# Patient Record
Sex: FEMALE | Race: WHITE | NOT HISPANIC OR LATINO | Employment: OTHER | URBAN - METROPOLITAN AREA
[De-identification: names, ages, dates, MRNs, and addresses within clinical notes are randomized per-mention and may not be internally consistent; named-entity substitution may affect disease eponyms.]

---

## 2018-07-24 ENCOUNTER — HOSPITAL ENCOUNTER (OUTPATIENT)
Dept: GENERAL RADIOLOGY | Facility: HOSPITAL | Age: 78
Discharge: HOME | End: 2018-07-24
Attending: ORTHOPAEDIC SURGERY

## 2018-07-24 ENCOUNTER — APPOINTMENT (OUTPATIENT)
Dept: PREADMISSION TESTING | Facility: HOSPITAL | Age: 78
End: 2018-07-24

## 2018-07-24 ENCOUNTER — HOSPITAL ENCOUNTER (OUTPATIENT)
Dept: GENERAL RADIOLOGY | Facility: HOSPITAL | Age: 78
Discharge: HOME | End: 2018-07-24
Attending: ORTHOPAEDIC SURGERY | Admitting: ORTHOPAEDIC SURGERY

## 2018-07-24 VITALS
HEART RATE: 59 BPM | WEIGHT: 197.8 LBS | RESPIRATION RATE: 16 BRPM | HEIGHT: 65 IN | DIASTOLIC BLOOD PRESSURE: 74 MMHG | TEMPERATURE: 97.9 F | OXYGEN SATURATION: 98 % | BODY MASS INDEX: 32.96 KG/M2 | SYSTOLIC BLOOD PRESSURE: 144 MMHG

## 2018-07-24 LAB
ALBUMIN SERPL-MCNC: 4 G/DL (ref 3.5–5.2)
ALBUMIN/GLOB SERPL: 1.4 G/DL
ALP SERPL-CCNC: 54 U/L (ref 39–117)
ALT SERPL W P-5'-P-CCNC: 10 U/L (ref 1–33)
ANION GAP SERPL CALCULATED.3IONS-SCNC: 9 MMOL/L
APTT PPP: 27 SECONDS (ref 22.7–35.4)
AST SERPL-CCNC: 13 U/L (ref 1–32)
BACTERIA UR QL AUTO: ABNORMAL /HPF
BASOPHILS # BLD AUTO: 0.01 10*3/MM3 (ref 0–0.2)
BASOPHILS NFR BLD AUTO: 0.2 % (ref 0–1.5)
BILIRUB SERPL-MCNC: 0.2 MG/DL (ref 0.1–1.2)
BILIRUB UR QL STRIP: NEGATIVE
BUN BLD-MCNC: 22 MG/DL (ref 8–23)
BUN/CREAT SERPL: 27.8 (ref 7–25)
CALCIUM SPEC-SCNC: 9.4 MG/DL (ref 8.6–10.5)
CHLORIDE SERPL-SCNC: 98 MMOL/L (ref 98–107)
CLARITY UR: CLEAR
CO2 SERPL-SCNC: 27 MMOL/L (ref 22–29)
COLOR UR: YELLOW
CREAT BLD-MCNC: 0.79 MG/DL (ref 0.57–1)
DEPRECATED RDW RBC AUTO: 47.6 FL (ref 37–54)
EOSINOPHIL # BLD AUTO: 0.12 10*3/MM3 (ref 0–0.7)
EOSINOPHIL NFR BLD AUTO: 3 % (ref 0.3–6.2)
ERYTHROCYTE [DISTWIDTH] IN BLOOD BY AUTOMATED COUNT: 13.4 % (ref 11.7–13)
GFR SERPL CREATININE-BSD FRML MDRD: 70 ML/MIN/1.73
GLOBULIN UR ELPH-MCNC: 2.8 GM/DL
GLUCOSE BLD-MCNC: 74 MG/DL (ref 65–99)
GLUCOSE UR STRIP-MCNC: NEGATIVE MG/DL
HCT VFR BLD AUTO: 35.8 % (ref 35.6–45.5)
HGB BLD-MCNC: 11.4 G/DL (ref 11.9–15.5)
HGB UR QL STRIP.AUTO: NEGATIVE
HYALINE CASTS UR QL AUTO: ABNORMAL /LPF
IMM GRANULOCYTES # BLD: 0 10*3/MM3 (ref 0–0.03)
IMM GRANULOCYTES NFR BLD: 0 % (ref 0–0.5)
INR PPP: 1.01 (ref 0.9–1.1)
KETONES UR QL STRIP: NEGATIVE
LEUKOCYTE ESTERASE UR QL STRIP.AUTO: ABNORMAL
LYMPHOCYTES # BLD AUTO: 1.76 10*3/MM3 (ref 0.9–4.8)
LYMPHOCYTES NFR BLD AUTO: 43.9 % (ref 19.6–45.3)
MCH RBC QN AUTO: 30.9 PG (ref 26.9–32)
MCHC RBC AUTO-ENTMCNC: 31.8 G/DL (ref 32.4–36.3)
MCV RBC AUTO: 97 FL (ref 80.5–98.2)
MONOCYTES # BLD AUTO: 0.32 10*3/MM3 (ref 0.2–1.2)
MONOCYTES NFR BLD AUTO: 8 % (ref 5–12)
NEUTROPHILS # BLD AUTO: 1.8 10*3/MM3 (ref 1.9–8.1)
NEUTROPHILS NFR BLD AUTO: 44.9 % (ref 42.7–76)
NITRITE UR QL STRIP: NEGATIVE
PH UR STRIP.AUTO: 6 [PH] (ref 5–8)
PLATELET # BLD AUTO: 207 10*3/MM3 (ref 140–500)
PMV BLD AUTO: 10.1 FL (ref 6–12)
POTASSIUM BLD-SCNC: 4.2 MMOL/L (ref 3.5–5.2)
PROT SERPL-MCNC: 6.8 G/DL (ref 6–8.5)
PROT UR QL STRIP: NEGATIVE
PROTHROMBIN TIME: 13.2 SECONDS (ref 11.7–14.2)
RBC # BLD AUTO: 3.69 10*6/MM3 (ref 3.9–5.2)
RBC # UR: ABNORMAL /HPF
REF LAB TEST METHOD: ABNORMAL
SODIUM BLD-SCNC: 134 MMOL/L (ref 136–145)
SP GR UR STRIP: 1.02 (ref 1–1.03)
SQUAMOUS #/AREA URNS HPF: ABNORMAL /HPF
UROBILINOGEN UR QL STRIP: ABNORMAL
WBC NRBC COR # BLD: 4.01 10*3/MM3 (ref 4.5–10.7)
WBC UR QL AUTO: ABNORMAL /HPF

## 2018-07-24 PROCEDURE — 85610 PROTHROMBIN TIME: CPT | Performed by: ORTHOPAEDIC SURGERY

## 2018-07-24 PROCEDURE — 80053 COMPREHEN METABOLIC PANEL: CPT | Performed by: ORTHOPAEDIC SURGERY

## 2018-07-24 PROCEDURE — 73560 X-RAY EXAM OF KNEE 1 OR 2: CPT

## 2018-07-24 PROCEDURE — 85730 THROMBOPLASTIN TIME PARTIAL: CPT | Performed by: ORTHOPAEDIC SURGERY

## 2018-07-24 PROCEDURE — 36415 COLL VENOUS BLD VENIPUNCTURE: CPT

## 2018-07-24 PROCEDURE — 93010 ELECTROCARDIOGRAM REPORT: CPT | Performed by: INTERNAL MEDICINE

## 2018-07-24 PROCEDURE — 87186 SC STD MICRODIL/AGAR DIL: CPT | Performed by: ORTHOPAEDIC SURGERY

## 2018-07-24 PROCEDURE — 93005 ELECTROCARDIOGRAM TRACING: CPT

## 2018-07-24 PROCEDURE — 87086 URINE CULTURE/COLONY COUNT: CPT | Performed by: ORTHOPAEDIC SURGERY

## 2018-07-24 PROCEDURE — 71046 X-RAY EXAM CHEST 2 VIEWS: CPT

## 2018-07-24 PROCEDURE — 81001 URINALYSIS AUTO W/SCOPE: CPT | Performed by: ORTHOPAEDIC SURGERY

## 2018-07-24 PROCEDURE — 85025 COMPLETE CBC W/AUTO DIFF WBC: CPT | Performed by: ORTHOPAEDIC SURGERY

## 2018-07-24 RX ORDER — OMEPRAZOLE/SODIUM BICARBONATE 20MG-1.1G
1 CAPSULE ORAL EVERY OTHER DAY
COMMUNITY

## 2018-07-24 RX ORDER — CHLORHEXIDINE GLUCONATE 500 MG/1
CLOTH TOPICAL
Status: ON HOLD | COMMUNITY
End: 2018-08-06

## 2018-07-24 RX ORDER — LISINOPRIL 10 MG/1
10 TABLET ORAL DAILY
COMMUNITY

## 2018-07-24 ASSESSMENT — KOOS JR
KOOS JR SCORE: 20
KOOS JR SCORE: 36.931

## 2018-07-24 NOTE — DISCHARGE INSTRUCTIONS
Take the following medications the morning of surgery with a small sip of water:  ZEGERID    ARRIVE AT  8AM.        General Instructions:  • Do not eat solid food after midnight the night before surgery.  • You may drink clear liquids day of surgery but must stop at least one hour before your hospital arrival time.  • It is beneficial for you to have a clear drink that contains carbohydrates the day of surgery.  We suggest a 12 to 20 ounce bottle of Gatorade or Powerade for non-diabetic patients or a 12 to 20 ounce bottle of G2 or Powerade Zero for diabetic patients. (Pediatric patients, are not advised to drink a 12 to 20 ounce carbohydrate drink)    Clear liquids are liquids you can see through.  Nothing red in color.     Plain water                               Sports drinks  Sodas                                   Gelatin (Jell-O)  Fruit juices without pulp such as white grape juice and apple juice  Popsicles that contain no fruit or yogurt  Tea or coffee (no cream or milk added)  Gatorade / Powerade  G2 / Powerade Zero    • Infants may have breast milk up to four hours before surgery.  • Infants drinking formula may drink formula up to six hours before surgery.   • Patients who avoid smoking, chewing tobacco and alcohol for 4 weeks prior to surgery have a reduced risk of post-operative complications.  Quit smoking as many days before surgery as you can.  • Do not smoke, use chewing tobacco or drink alcohol the day of surgery.   • If applicable bring your C-PAP/ BI-PAP machine.  • Bring any papers given to you in the doctor’s office.  • Wear clean comfortable clothes and socks.  • Do not wear contact lenses or make-up.  Bring a case for your glasses.   • Bring crutches or walker if applicable.  • Remove all piercings.  Leave jewelry and any other valuables at home.  • Hair extensions with metal clips must be removed prior to surgery.  • The Pre-Admission Testing nurse will instruct you to bring medications if  unable to obtain an accurate list in Pre-Admission Testing.        If you were given a blood bank ID arm band remember to bring it with you the day of surgery.    Preventing a Surgical Site Infection:  • For 2 to 3 days before surgery, avoid shaving with a razor because the razor can irritate skin and make it easier to develop an infection.    • Any areas of open skin can increase the risk of a post-operative wound infection by allowing bacteria to enter and travel throughout the body.  Notify your surgeon if you have any skin wounds / rashes even if it is not near the expected surgical site.  The area will need assessed to determine if surgery should be delayed until it is healed.  • The night prior to surgery sleep in a clean bed with clean clothing.  Do not allow pets to sleep with you.  • Shower on the morning of surgery using a fresh bar of anti-bacterial soap (such as Dial) and clean washcloth.  Dry with a clean towel and dress in clean clothing.  • Ask your surgeon if you will be receiving antibiotics prior to surgery.  • Make sure you, your family, and all healthcare providers clean their hands with soap and water or an alcohol based hand  before caring for you or your wound.    Day of surgery:  Upon arrival, a Pre-op nurse and Anesthesiologist will review your health history, obtain vital signs, and answer questions you may have.  The only belongings needed at this time will be your home medications and if applicable your C-PAP/BI-PAP machine.  If you are staying overnight your family can leave the rest of your belongings in the car and bring them to your room later.  A Pre-op nurse will start an IV and you may receive medication in preparation for surgery, including something to help you relax.  Your family will be able to see you in the Pre-op area.  While you are in surgery your family should notify the waiting room  if they leave the waiting room area and provide a contact phone  number.    Please be aware that surgery does come with discomfort.  We want to make every effort to control your discomfort so please discuss any uncontrolled symptoms with your nurse.   Your doctor will most likely have prescribed pain medications.      If you are going home after surgery you will receive individualized written care instructions before being discharged.  A responsible adult must drive you to and from the hospital on the day of your surgery and stay with you for 24 hours.    If you are staying overnight following surgery, you will be transported to your hospital room following the recovery period.  [facility] has all private rooms.    You have received a list of surgical assistants for your reference.  If you have any questions please call Pre-Admission Testing at [phone].  Deductibles and co-payments are collected on the day of service. Please be prepared to pay the required co-pay, deductible or deposit on the day of service as defined by your plan.    2% CHLORAHEXIDINE GLUCONATE* CLOTH  Preparing or “prepping” skin before surgery can reduce the risk of infection at the surgical site. To make the process easier, [facility] has chosen disposable cloths moistened with a rinse-free, 2% Chlorhexidine Gluconate (CHG) antiseptic solution. The steps below outline the prepping process and should be carefully followed.        Use the prep cloth on the area that is circled in the diagram             Directions Night before Surgery  1) Shower using a fresh bar of anti-bacterial soap (such as Dial) and clean washcloth.  Use a clean towel to completely dry your skin.  2) Do not use any lotions, oils or creams on your skin.  3) Open the package and remove 1 cloth, wipe your skin for 30 seconds in a circular motion.  Allow to dry for 3 minutes.  4) Repeat #3 with second cloth.  5) Do not touch your eyes, ears, or mouth with the prep cloth.  6) Allow the wet prep solution to air  dry.  7) Discard the prep cloth and wash your hands with soap and water.   8) Dress in clean bed clothes and sleep on fresh clean bed sheets.   9) You may experience some temporary itching after the prep.    Directions Day of Surgery  1) Repeat steps 1,2,3,4,5,6,7, and 9.   2) Dress in clean clothes before coming to the hospital.    BACTROBAN NASAL OINTMENT  There are many germs normally in your nose. Bactroban is an ointment that will help reduce these germs. Please follow these instructions for Bactroban use:      ____The day before surgery in the morning  Date________    ____The day before surgery in the evening              Date________    ____The day of surgery in the morning    Date________    **Squirt ½ package of Bactroban Ointment onto a cotton applicator and apply to inside of 1st nostril.  Squirt the remaining Bactroban and apply to the inside of the other nostril.

## 2018-07-26 LAB — BACTERIA SPEC AEROBE CULT: ABNORMAL

## 2018-08-06 ENCOUNTER — HOSPITAL ENCOUNTER (INPATIENT)
Facility: HOSPITAL | Age: 78
LOS: 2 days | Discharge: HOME HEALTH | End: 2018-08-08
Attending: ORTHOPAEDIC SURGERY | Admitting: ORTHOPAEDIC SURGERY

## 2018-08-06 ENCOUNTER — ANESTHESIA EVENT (OUTPATIENT)
Dept: PERIOP | Facility: HOSPITAL | Age: 78
End: 2018-08-06

## 2018-08-06 ENCOUNTER — APPOINTMENT (OUTPATIENT)
Dept: GENERAL RADIOLOGY | Facility: HOSPITAL | Age: 78
End: 2018-08-06

## 2018-08-06 ENCOUNTER — ANESTHESIA (OUTPATIENT)
Dept: PERIOP | Facility: HOSPITAL | Age: 78
End: 2018-08-06

## 2018-08-06 DIAGNOSIS — R26.2 DIFFICULTY WALKING: Primary | ICD-10-CM

## 2018-08-06 DIAGNOSIS — M17.11 PRIMARY OSTEOARTHRITIS OF RIGHT KNEE: ICD-10-CM

## 2018-08-06 PROCEDURE — C1713 ANCHOR/SCREW BN/BN,TIS/BN: HCPCS | Performed by: ORTHOPAEDIC SURGERY

## 2018-08-06 PROCEDURE — 0SRC0J9 REPLACEMENT OF RIGHT KNEE JOINT WITH SYNTHETIC SUBSTITUTE, CEMENTED, OPEN APPROACH: ICD-10-PCS | Performed by: ORTHOPAEDIC SURGERY

## 2018-08-06 PROCEDURE — 25010000002 KETOROLAC TROMETHAMINE PER 15 MG: Performed by: ORTHOPAEDIC SURGERY

## 2018-08-06 PROCEDURE — 25010000002 PROPOFOL 10 MG/ML EMULSION: Performed by: NURSE ANESTHETIST, CERTIFIED REGISTERED

## 2018-08-06 PROCEDURE — 25010000003 CEFAZOLIN IN DEXTROSE 2-4 GM/100ML-% SOLUTION: Performed by: ORTHOPAEDIC SURGERY

## 2018-08-06 PROCEDURE — 25010000002 FENTANYL CITRATE (PF) 100 MCG/2ML SOLUTION: Performed by: NURSE ANESTHETIST, CERTIFIED REGISTERED

## 2018-08-06 PROCEDURE — C1776 JOINT DEVICE (IMPLANTABLE): HCPCS | Performed by: ORTHOPAEDIC SURGERY

## 2018-08-06 PROCEDURE — 25010000003 CEFAZOLIN IN DEXTROSE 2-4 GM/100ML-% SOLUTION: Performed by: NURSE ANESTHETIST, CERTIFIED REGISTERED

## 2018-08-06 PROCEDURE — 25010000002 VANCOMYCIN 10 G RECONSTITUTED SOLUTION: Performed by: ORTHOPAEDIC SURGERY

## 2018-08-06 PROCEDURE — 73560 X-RAY EXAM OF KNEE 1 OR 2: CPT

## 2018-08-06 PROCEDURE — 25010000002 ONDANSETRON PER 1 MG: Performed by: NURSE ANESTHETIST, CERTIFIED REGISTERED

## 2018-08-06 PROCEDURE — 25010000002 SUCCINYLCHOLINE PER 20 MG: Performed by: NURSE ANESTHETIST, CERTIFIED REGISTERED

## 2018-08-06 PROCEDURE — 25010000002 MIDAZOLAM PER 1 MG: Performed by: ANESTHESIOLOGY

## 2018-08-06 PROCEDURE — 97110 THERAPEUTIC EXERCISES: CPT

## 2018-08-06 PROCEDURE — 25010000002 HYDROMORPHONE PER 4 MG: Performed by: NURSE ANESTHETIST, CERTIFIED REGISTERED

## 2018-08-06 PROCEDURE — 97161 PT EVAL LOW COMPLEX 20 MIN: CPT

## 2018-08-06 PROCEDURE — 25010000002 DEXAMETHASONE PER 1 MG: Performed by: NURSE ANESTHETIST, CERTIFIED REGISTERED

## 2018-08-06 DEVICE — JOURNEY TIBIAL BASEPLATE NONPOROUS                                    RIGHT SIZE 4
Type: IMPLANTABLE DEVICE | Site: KNEE | Status: FUNCTIONAL
Brand: JOURNEY

## 2018-08-06 DEVICE — PALACOS® R IS A FAST-CURING, RADIOPAQUE, POLY(METHYL METHACRYLATE)-BASED BONE CEMENT.PALACOS ® R CONTAINS THE X-RAY CONTRAST MEDIUM ZIRCONIUM DIOXIDE. TO IMPROVE VISIBILITY IN THE SURGICAL FIELD PALACOS ® R HAS BEEN COLOURED WITH CHLOROPHYLL (E141). THE BONE CEMENT IS PREPARED DIRECTLY BEFORE USE BY MIXING A POLYMER POWDER COMPONENT WITH A LIQUID MONOMER COMPONENT. A DUCTILE DOUGH FORMS WHICH CURES WITHIN A FEW MINUTES.
Type: IMPLANTABLE DEVICE | Site: KNEE | Status: FUNCTIONAL
Brand: PALACOS®

## 2018-08-06 DEVICE — JOURNEY II INSERT XLPE DEEP DISHED RIGHT SIZE 3-4 11MM
Type: IMPLANTABLE DEVICE | Site: KNEE | Status: FUNCTIONAL
Brand: JOURNEY

## 2018-08-06 DEVICE — IMPLANTABLE DEVICE: Type: IMPLANTABLE DEVICE | Site: KNEE | Status: FUNCTIONAL

## 2018-08-06 DEVICE — JOURNEY 7.5 ROUND RESURF PAT 35MM STANDARD
Type: IMPLANTABLE DEVICE | Site: KNEE | Status: FUNCTIONAL
Brand: JOURNEY

## 2018-08-06 DEVICE — JOURNEY II CR FEMORAL OXINIUM                                    NONPOROUS RIGHT SIZE 5
Type: IMPLANTABLE DEVICE | Site: KNEE | Status: FUNCTIONAL
Brand: JOURNEY

## 2018-08-06 RX ORDER — ACETAMINOPHEN 650 MG/1
650 SUPPOSITORY RECTAL EVERY 4 HOURS PRN
Status: DISCONTINUED | OUTPATIENT
Start: 2018-08-06 | End: 2018-08-08 | Stop reason: HOSPADM

## 2018-08-06 RX ORDER — ONDANSETRON 2 MG/ML
4 INJECTION INTRAMUSCULAR; INTRAVENOUS EVERY 6 HOURS PRN
Status: DISCONTINUED | OUTPATIENT
Start: 2018-08-06 | End: 2018-08-08 | Stop reason: HOSPADM

## 2018-08-06 RX ORDER — HYDROCODONE BITARTRATE AND ACETAMINOPHEN 7.5; 325 MG/1; MG/1
1 TABLET ORAL ONCE AS NEEDED
Status: DISCONTINUED | OUTPATIENT
Start: 2018-08-06 | End: 2018-08-06 | Stop reason: HOSPADM

## 2018-08-06 RX ORDER — ASPIRIN 325 MG
325 TABLET, DELAYED RELEASE (ENTERIC COATED) ORAL DAILY
Status: DISCONTINUED | OUTPATIENT
Start: 2018-08-06 | End: 2018-08-08 | Stop reason: HOSPADM

## 2018-08-06 RX ORDER — LIDOCAINE HYDROCHLORIDE 20 MG/ML
INJECTION, SOLUTION INFILTRATION; PERINEURAL AS NEEDED
Status: DISCONTINUED | OUTPATIENT
Start: 2018-08-06 | End: 2018-08-06 | Stop reason: SURG

## 2018-08-06 RX ORDER — LISINOPRIL 10 MG/1
10 TABLET ORAL DAILY
Status: DISCONTINUED | OUTPATIENT
Start: 2018-08-06 | End: 2018-08-08 | Stop reason: HOSPADM

## 2018-08-06 RX ORDER — OXYCODONE AND ACETAMINOPHEN 7.5; 325 MG/1; MG/1
1 TABLET ORAL ONCE AS NEEDED
Status: DISCONTINUED | OUTPATIENT
Start: 2018-08-06 | End: 2018-08-06 | Stop reason: HOSPADM

## 2018-08-06 RX ORDER — PROMETHAZINE HYDROCHLORIDE 25 MG/1
25 TABLET ORAL ONCE AS NEEDED
Status: DISCONTINUED | OUTPATIENT
Start: 2018-08-06 | End: 2018-08-06 | Stop reason: HOSPADM

## 2018-08-06 RX ORDER — FAMOTIDINE 10 MG/ML
20 INJECTION, SOLUTION INTRAVENOUS ONCE
Status: DISCONTINUED | OUTPATIENT
Start: 2018-08-06 | End: 2018-08-06 | Stop reason: HOSPADM

## 2018-08-06 RX ORDER — ONDANSETRON 2 MG/ML
INJECTION INTRAMUSCULAR; INTRAVENOUS AS NEEDED
Status: DISCONTINUED | OUTPATIENT
Start: 2018-08-06 | End: 2018-08-06 | Stop reason: SURG

## 2018-08-06 RX ORDER — EPHEDRINE SULFATE 50 MG/ML
5 INJECTION, SOLUTION INTRAVENOUS ONCE AS NEEDED
Status: DISCONTINUED | OUTPATIENT
Start: 2018-08-06 | End: 2018-08-06 | Stop reason: HOSPADM

## 2018-08-06 RX ORDER — FLUMAZENIL 0.1 MG/ML
0.2 INJECTION INTRAVENOUS AS NEEDED
Status: DISCONTINUED | OUTPATIENT
Start: 2018-08-06 | End: 2018-08-06 | Stop reason: HOSPADM

## 2018-08-06 RX ORDER — OXYCODONE HYDROCHLORIDE AND ACETAMINOPHEN 5; 325 MG/1; MG/1
2 TABLET ORAL EVERY 4 HOURS PRN
Status: DISCONTINUED | OUTPATIENT
Start: 2018-08-06 | End: 2018-08-08 | Stop reason: HOSPADM

## 2018-08-06 RX ORDER — ROCURONIUM BROMIDE 10 MG/ML
INJECTION, SOLUTION INTRAVENOUS AS NEEDED
Status: DISCONTINUED | OUTPATIENT
Start: 2018-08-06 | End: 2018-08-06 | Stop reason: SURG

## 2018-08-06 RX ORDER — BISACODYL 10 MG
10 SUPPOSITORY, RECTAL RECTAL DAILY PRN
Status: DISCONTINUED | OUTPATIENT
Start: 2018-08-06 | End: 2018-08-08 | Stop reason: HOSPADM

## 2018-08-06 RX ORDER — ONDANSETRON 2 MG/ML
4 INJECTION INTRAMUSCULAR; INTRAVENOUS ONCE AS NEEDED
Status: COMPLETED | OUTPATIENT
Start: 2018-08-06 | End: 2018-08-06

## 2018-08-06 RX ORDER — MIDAZOLAM HYDROCHLORIDE 1 MG/ML
1 INJECTION INTRAMUSCULAR; INTRAVENOUS
Status: DISCONTINUED | OUTPATIENT
Start: 2018-08-06 | End: 2018-08-06 | Stop reason: HOSPADM

## 2018-08-06 RX ORDER — HYDROMORPHONE HYDROCHLORIDE 1 MG/ML
0.5 INJECTION, SOLUTION INTRAMUSCULAR; INTRAVENOUS; SUBCUTANEOUS
Status: DISCONTINUED | OUTPATIENT
Start: 2018-08-06 | End: 2018-08-06 | Stop reason: HOSPADM

## 2018-08-06 RX ORDER — BISACODYL 5 MG/1
10 TABLET, DELAYED RELEASE ORAL DAILY PRN
Status: DISCONTINUED | OUTPATIENT
Start: 2018-08-06 | End: 2018-08-08 | Stop reason: HOSPADM

## 2018-08-06 RX ORDER — PROMETHAZINE HYDROCHLORIDE 25 MG/1
25 SUPPOSITORY RECTAL ONCE AS NEEDED
Status: DISCONTINUED | OUTPATIENT
Start: 2018-08-06 | End: 2018-08-06 | Stop reason: HOSPADM

## 2018-08-06 RX ORDER — ACETAMINOPHEN 500 MG
1000 TABLET ORAL ONCE
Status: COMPLETED | OUTPATIENT
Start: 2018-08-06 | End: 2018-08-06

## 2018-08-06 RX ORDER — SODIUM CHLORIDE 0.9 % (FLUSH) 0.9 %
1-10 SYRINGE (ML) INJECTION AS NEEDED
Status: DISCONTINUED | OUTPATIENT
Start: 2018-08-06 | End: 2018-08-06 | Stop reason: HOSPADM

## 2018-08-06 RX ORDER — ACETAMINOPHEN 325 MG/1
650 TABLET ORAL EVERY 4 HOURS PRN
Status: DISCONTINUED | OUTPATIENT
Start: 2018-08-06 | End: 2018-08-08 | Stop reason: HOSPADM

## 2018-08-06 RX ORDER — LIDOCAINE HYDROCHLORIDE 10 MG/ML
0.5 INJECTION, SOLUTION EPIDURAL; INFILTRATION; INTRACAUDAL; PERINEURAL ONCE AS NEEDED
Status: DISCONTINUED | OUTPATIENT
Start: 2018-08-06 | End: 2018-08-06 | Stop reason: HOSPADM

## 2018-08-06 RX ORDER — ONDANSETRON 4 MG/1
4 TABLET, FILM COATED ORAL EVERY 6 HOURS PRN
Status: DISCONTINUED | OUTPATIENT
Start: 2018-08-06 | End: 2018-08-08 | Stop reason: HOSPADM

## 2018-08-06 RX ORDER — MIDAZOLAM HYDROCHLORIDE 1 MG/ML
2 INJECTION INTRAMUSCULAR; INTRAVENOUS
Status: DISCONTINUED | OUTPATIENT
Start: 2018-08-06 | End: 2018-08-06 | Stop reason: HOSPADM

## 2018-08-06 RX ORDER — MAGNESIUM HYDROXIDE 1200 MG/15ML
LIQUID ORAL AS NEEDED
Status: DISCONTINUED | OUTPATIENT
Start: 2018-08-06 | End: 2018-08-06 | Stop reason: HOSPADM

## 2018-08-06 RX ORDER — ONDANSETRON 4 MG/1
4 TABLET, ORALLY DISINTEGRATING ORAL EVERY 6 HOURS PRN
Status: DISCONTINUED | OUTPATIENT
Start: 2018-08-06 | End: 2018-08-08 | Stop reason: HOSPADM

## 2018-08-06 RX ORDER — CEFAZOLIN SODIUM 2 G/100ML
INJECTION, SOLUTION INTRAVENOUS AS NEEDED
Status: DISCONTINUED | OUTPATIENT
Start: 2018-08-06 | End: 2018-08-06 | Stop reason: SURG

## 2018-08-06 RX ORDER — NALOXONE HCL 0.4 MG/ML
0.1 VIAL (ML) INJECTION
Status: DISCONTINUED | OUTPATIENT
Start: 2018-08-06 | End: 2018-08-08 | Stop reason: HOSPADM

## 2018-08-06 RX ORDER — ACETAMINOPHEN 325 MG/1
325 TABLET ORAL EVERY 4 HOURS PRN
Status: DISCONTINUED | OUTPATIENT
Start: 2018-08-06 | End: 2018-08-08 | Stop reason: HOSPADM

## 2018-08-06 RX ORDER — PROMETHAZINE HYDROCHLORIDE 25 MG/1
12.5 TABLET ORAL ONCE AS NEEDED
Status: DISCONTINUED | OUTPATIENT
Start: 2018-08-06 | End: 2018-08-06 | Stop reason: HOSPADM

## 2018-08-06 RX ORDER — LABETALOL HYDROCHLORIDE 5 MG/ML
5 INJECTION, SOLUTION INTRAVENOUS
Status: DISCONTINUED | OUTPATIENT
Start: 2018-08-06 | End: 2018-08-06 | Stop reason: HOSPADM

## 2018-08-06 RX ORDER — ACETAMINOPHEN 160 MG/5ML
650 SOLUTION ORAL EVERY 4 HOURS PRN
Status: DISCONTINUED | OUTPATIENT
Start: 2018-08-06 | End: 2018-08-08 | Stop reason: HOSPADM

## 2018-08-06 RX ORDER — PROMETHAZINE HYDROCHLORIDE 25 MG/ML
12.5 INJECTION, SOLUTION INTRAMUSCULAR; INTRAVENOUS ONCE AS NEEDED
Status: DISCONTINUED | OUTPATIENT
Start: 2018-08-06 | End: 2018-08-06 | Stop reason: HOSPADM

## 2018-08-06 RX ORDER — SODIUM CHLORIDE, SODIUM LACTATE, POTASSIUM CHLORIDE, CALCIUM CHLORIDE 600; 310; 30; 20 MG/100ML; MG/100ML; MG/100ML; MG/100ML
9 INJECTION, SOLUTION INTRAVENOUS CONTINUOUS
Status: DISCONTINUED | OUTPATIENT
Start: 2018-08-06 | End: 2018-08-08 | Stop reason: HOSPADM

## 2018-08-06 RX ORDER — NALOXONE HCL 0.4 MG/ML
0.2 VIAL (ML) INJECTION AS NEEDED
Status: DISCONTINUED | OUTPATIENT
Start: 2018-08-06 | End: 2018-08-06 | Stop reason: HOSPADM

## 2018-08-06 RX ORDER — SUCCINYLCHOLINE CHLORIDE 20 MG/ML
INJECTION INTRAMUSCULAR; INTRAVENOUS AS NEEDED
Status: DISCONTINUED | OUTPATIENT
Start: 2018-08-06 | End: 2018-08-06 | Stop reason: SURG

## 2018-08-06 RX ORDER — OXYCODONE HYDROCHLORIDE AND ACETAMINOPHEN 5; 325 MG/1; MG/1
1 TABLET ORAL EVERY 4 HOURS PRN
Status: DISCONTINUED | OUTPATIENT
Start: 2018-08-06 | End: 2018-08-08 | Stop reason: HOSPADM

## 2018-08-06 RX ORDER — FENTANYL CITRATE 50 UG/ML
50 INJECTION, SOLUTION INTRAMUSCULAR; INTRAVENOUS
Status: DISCONTINUED | OUTPATIENT
Start: 2018-08-06 | End: 2018-08-06 | Stop reason: HOSPADM

## 2018-08-06 RX ORDER — SODIUM CHLORIDE, SODIUM LACTATE, POTASSIUM CHLORIDE, CALCIUM CHLORIDE 600; 310; 30; 20 MG/100ML; MG/100ML; MG/100ML; MG/100ML
100 INJECTION, SOLUTION INTRAVENOUS CONTINUOUS
Status: DISCONTINUED | OUTPATIENT
Start: 2018-08-06 | End: 2018-08-08 | Stop reason: HOSPADM

## 2018-08-06 RX ORDER — SODIUM CHLORIDE 0.9 % (FLUSH) 0.9 %
1-10 SYRINGE (ML) INJECTION AS NEEDED
Status: DISCONTINUED | OUTPATIENT
Start: 2018-08-06 | End: 2018-08-08 | Stop reason: HOSPADM

## 2018-08-06 RX ORDER — SENNA AND DOCUSATE SODIUM 50; 8.6 MG/1; MG/1
2 TABLET, FILM COATED ORAL NIGHTLY
Status: DISCONTINUED | OUTPATIENT
Start: 2018-08-06 | End: 2018-08-08 | Stop reason: HOSPADM

## 2018-08-06 RX ORDER — HYDROMORPHONE HYDROCHLORIDE 1 MG/ML
1 INJECTION, SOLUTION INTRAMUSCULAR; INTRAVENOUS; SUBCUTANEOUS EVERY 4 HOURS PRN
Status: DISCONTINUED | OUTPATIENT
Start: 2018-08-06 | End: 2018-08-08 | Stop reason: HOSPADM

## 2018-08-06 RX ORDER — FERROUS SULFATE 325(65) MG
325 TABLET ORAL
Status: DISCONTINUED | OUTPATIENT
Start: 2018-08-07 | End: 2018-08-08 | Stop reason: HOSPADM

## 2018-08-06 RX ORDER — DIPHENHYDRAMINE HYDROCHLORIDE 50 MG/ML
12.5 INJECTION INTRAMUSCULAR; INTRAVENOUS
Status: DISCONTINUED | OUTPATIENT
Start: 2018-08-06 | End: 2018-08-06 | Stop reason: HOSPADM

## 2018-08-06 RX ORDER — KETOROLAC TROMETHAMINE 15 MG/ML
15 INJECTION, SOLUTION INTRAMUSCULAR; INTRAVENOUS EVERY 6 HOURS
Status: DISPENSED | OUTPATIENT
Start: 2018-08-06 | End: 2018-08-07

## 2018-08-06 RX ORDER — CEFAZOLIN SODIUM 2 G/100ML
2 INJECTION, SOLUTION INTRAVENOUS EVERY 8 HOURS
Status: COMPLETED | OUTPATIENT
Start: 2018-08-06 | End: 2018-08-07

## 2018-08-06 RX ORDER — FENTANYL CITRATE 50 UG/ML
INJECTION, SOLUTION INTRAMUSCULAR; INTRAVENOUS AS NEEDED
Status: DISCONTINUED | OUTPATIENT
Start: 2018-08-06 | End: 2018-08-06 | Stop reason: SURG

## 2018-08-06 RX ORDER — PROPOFOL 10 MG/ML
VIAL (ML) INTRAVENOUS AS NEEDED
Status: DISCONTINUED | OUTPATIENT
Start: 2018-08-06 | End: 2018-08-06 | Stop reason: SURG

## 2018-08-06 RX ORDER — DEXAMETHASONE SODIUM PHOSPHATE 10 MG/ML
INJECTION INTRAMUSCULAR; INTRAVENOUS AS NEEDED
Status: DISCONTINUED | OUTPATIENT
Start: 2018-08-06 | End: 2018-08-06 | Stop reason: SURG

## 2018-08-06 RX ORDER — TRANEXAMIC ACID 100 MG/ML
INJECTION, SOLUTION INTRAVENOUS AS NEEDED
Status: DISCONTINUED | OUTPATIENT
Start: 2018-08-06 | End: 2018-08-06 | Stop reason: SURG

## 2018-08-06 RX ADMIN — SERTRALINE 50 MG: 50 TABLET, FILM COATED ORAL at 17:24

## 2018-08-06 RX ADMIN — PROPOFOL 150 MG: 10 INJECTION, EMULSION INTRAVENOUS at 10:39

## 2018-08-06 RX ADMIN — ONDANSETRON 4 MG: 2 INJECTION INTRAMUSCULAR; INTRAVENOUS at 12:02

## 2018-08-06 RX ADMIN — FENTANYL CITRATE 50 MCG: 50 INJECTION INTRAMUSCULAR; INTRAVENOUS at 12:07

## 2018-08-06 RX ADMIN — DOCUSATE SODIUM -SENNOSIDES 2 TABLET: 50; 8.6 TABLET, COATED ORAL at 19:48

## 2018-08-06 RX ADMIN — SODIUM CHLORIDE, POTASSIUM CHLORIDE, SODIUM LACTATE AND CALCIUM CHLORIDE 100 ML/HR: 600; 310; 30; 20 INJECTION, SOLUTION INTRAVENOUS at 17:25

## 2018-08-06 RX ADMIN — CEFAZOLIN SODIUM 2 G: 2 INJECTION, SOLUTION INTRAVENOUS at 11:45

## 2018-08-06 RX ADMIN — ROCURONIUM BROMIDE 5 MG: 10 INJECTION INTRAVENOUS at 10:45

## 2018-08-06 RX ADMIN — CEFAZOLIN SODIUM 2 G: 2 INJECTION, SOLUTION INTRAVENOUS at 19:44

## 2018-08-06 RX ADMIN — SUCCINYLCHOLINE CHLORIDE 100 MG: 20 INJECTION, SOLUTION INTRAMUSCULAR; INTRAVENOUS; PARENTERAL at 10:45

## 2018-08-06 RX ADMIN — MIDAZOLAM 1 MG: 1 INJECTION INTRAMUSCULAR; INTRAVENOUS at 09:27

## 2018-08-06 RX ADMIN — KETOROLAC TROMETHAMINE 15 MG: 15 INJECTION, SOLUTION INTRAMUSCULAR; INTRAVENOUS at 19:44

## 2018-08-06 RX ADMIN — ACETAMINOPHEN 1000 MG: 500 TABLET, FILM COATED ORAL at 08:51

## 2018-08-06 RX ADMIN — TRANEXAMIC ACID 1000 MG: 100 INJECTION, SOLUTION INTRAVENOUS at 11:44

## 2018-08-06 RX ADMIN — PROPOFOL 50 MG: 10 INJECTION, EMULSION INTRAVENOUS at 10:42

## 2018-08-06 RX ADMIN — FENTANYL CITRATE 50 MCG: 50 INJECTION INTRAMUSCULAR; INTRAVENOUS at 12:17

## 2018-08-06 RX ADMIN — KETOROLAC TROMETHAMINE 15 MG: 15 INJECTION, SOLUTION INTRAMUSCULAR; INTRAVENOUS at 13:19

## 2018-08-06 RX ADMIN — LISINOPRIL 10 MG: 10 TABLET ORAL at 17:24

## 2018-08-06 RX ADMIN — ONDANSETRON 4 MG: 2 INJECTION INTRAMUSCULAR; INTRAVENOUS at 13:00

## 2018-08-06 RX ADMIN — FENTANYL CITRATE 100 MCG: 50 INJECTION INTRAMUSCULAR; INTRAVENOUS at 10:53

## 2018-08-06 RX ADMIN — SODIUM CHLORIDE, POTASSIUM CHLORIDE, SODIUM LACTATE AND CALCIUM CHLORIDE 9 ML/HR: 600; 310; 30; 20 INJECTION, SOLUTION INTRAVENOUS at 09:27

## 2018-08-06 RX ADMIN — HYDROMORPHONE HYDROCHLORIDE 0.5 MG: 1 INJECTION, SOLUTION INTRAMUSCULAR; INTRAVENOUS; SUBCUTANEOUS at 12:45

## 2018-08-06 RX ADMIN — HYDROMORPHONE HYDROCHLORIDE 0.5 MG: 1 INJECTION, SOLUTION INTRAMUSCULAR; INTRAVENOUS; SUBCUTANEOUS at 12:55

## 2018-08-06 RX ADMIN — LIDOCAINE HYDROCHLORIDE 60 MG: 20 INJECTION, SOLUTION INFILTRATION; PERINEURAL at 10:39

## 2018-08-06 RX ADMIN — ASPIRIN 325 MG: 325 TABLET, DELAYED RELEASE ORAL at 17:24

## 2018-08-06 RX ADMIN — DEXAMETHASONE SODIUM PHOSPHATE 8 MG: 10 INJECTION INTRAMUSCULAR; INTRAVENOUS at 12:02

## 2018-08-06 RX ADMIN — VANCOMYCIN HYDROCHLORIDE 1500 MG: 10 INJECTION, POWDER, LYOPHILIZED, FOR SOLUTION INTRAVENOUS at 08:51

## 2018-08-06 NOTE — ANESTHESIA PROCEDURE NOTES
Airway  Urgency: elective    Airway not difficult    General Information and Staff    Patient location during procedure: OR  Anesthesiologist: MARY BENNETT  CRNA: NELSY IVORY    Indications and Patient Condition  Indications for airway management: airway protection    Preoxygenated: yes  Mask difficulty assessment: 1 - vent by mask    Final Airway Details  Final airway type: endotracheal airway      Successful airway: ETT  Cuffed: yes   Successful intubation technique: direct laryngoscopy  Facilitating devices/methods: intubating stylet  Endotracheal tube insertion site: oral  Blade: Decker  Blade size: #2  ETT size: 7.0 mm  Cormack-Lehane Classification: grade IIa - partial view of glottis  Placement verified by: chest auscultation and capnometry   Cuff volume (mL): 7  Measured from: lips  ETT to lips (cm): 21  Number of attempts at approach: 1    Additional Comments  SIVI.  EYES TAPED CLOSED PRIOR TO DL.  INTUBATION AS CHARTED ABOVE.  APPEARS ATRAUMATIC.  NO CHANGE TO DENTITION. +ETCO2. +BBS. +CR.

## 2018-08-06 NOTE — THERAPY EVALUATION
Acute Care - Physical Therapy Initial Evaluation  [facility]     Patient Name: Roxana Ware  : 1940  MRN: [id number]  Today's Date: 2018   Onset of Illness/Injury or Date of Surgery: 18  Date of Referral to PT: 18  Referring Physician: Benjamin Gonzalez      Admit Date: 2018    Visit Dx:     ICD-10-CM ICD-9-CM   1. Difficulty walking R26.2 719.7     Patient Active Problem List   Diagnosis   • Right knee DJD     Past Medical History:   Diagnosis Date   • Arthritis    • Depression    • GERD (gastroesophageal reflux disease)    • Hypertension    • PONV (postoperative nausea and vomiting)      Past Surgical History:   Procedure Laterality Date   • COLONOSCOPY     • ROTATOR CUFF REPAIR          PT ASSESSMENT (last 12 hours)      Physical Therapy Evaluation     Row Name 18 7841          PT Evaluation Time/Intention    Subjective Information complains of;weakness;fatigue;pain  -MS     Document Type evaluation  -MS     Mode of Treatment physical therapy;individual therapy  -MS     Patient Effort good  -MS     Comment Pt. reports pain/soreness in her Right knee this PM as well as nausea with upright mobility. Otherwise, pt. agreeable to work with P.T.  -MS     Row Name 18 0647          General Information    Onset of Illness/Injury or Date of Surgery 18  -MS     Referring Physician Benjamin Gonzalez  -MS     Patient Observations agree to therapy;cooperative  -MS     Prior Level of Function independent:  -MS     Equipment Currently Used at Home none  -MS     Pertinent History of Current Functional Problem Pt. is s/p Right TKR  -MS     Existing Precautions/Restrictions fall  -MS     Equipment Ordered for Patient --   Pt. already owns a Rwx and BSC for home use.  -MS     Risks Reviewed patient:  -MS     Benefits Reviewed patient:  -MS     Barriers to Rehab none identified  -MS     Row Name 18 1790          Cognitive Assessment/Intervention- PT/OT    Orientation Status  (Cognition) oriented x 3  -MS     Follows Commands (Cognition) WNL  -MS     Personal Safety Interventions fall prevention program maintained;gait belt;nonskid shoes/slippers when out of bed;supervised activity  -MS     Row Name 08/06/18 1639          Mobility Assessment/Treatment    Extremity Weight-bearing Status right lower extremity  -MS     Right Lower Extremity (Weight-bearing Status) weight-bearing as tolerated (WBAT)  -MS     Row Name 08/06/18 1639          Bed Mobility Assessment/Treatment    Bed Mobility Assessment/Treatment supine-sit;sit-supine  -MS     Supine-Sit Todd (Bed Mobility) contact guard  -MS     Row Name 08/06/18 1639          Transfer Assessment/Treatment    Transfer Assessment/Treatment sit-stand transfer;stand-sit transfer  -MS     Sit-Stand Todd (Transfers) minimum assist (75% patient effort)  -MS     Stand-Sit Todd (Transfers) minimum assist (75% patient effort)  -MS     Row Name 08/06/18 1639          Sit-Stand Transfer    Assistive Device (Sit-Stand Transfers) walker, front-wheeled  -MS     Row Name 08/06/18 1639          Stand-Sit Transfer    Assistive Device (Stand-Sit Transfers) walker, front-wheeled  -MS     Row Name 08/06/18 1639          Gait/Stairs Assessment/Training    Todd Level (Gait) minimum assist (75% patient effort)  -MS     Assistive Device (Gait) walker, front-wheeled  -MS     Distance in Feet (Gait) 5 feet  -MS     Pattern (Gait) step-to  -MS     Deviations/Abnormal Patterns (Gait) antalgic;skylar decreased  -MS     Comment (Gait/Stairs) x 3 standing breaks due to nausea, dry heaves.  -MS     Row Name 08/06/18 1639          General ROM    GENERAL ROM COMMENTS BUE/LLE (WFL's)  -MS     Row Name 08/06/18 1639          General Assessment (Manual Muscle Testing)    Comment, General Manual Muscle Testing (MMT) Assessment BUE/LLE (>/= 3/5)  -MS     Row Name 08/06/18 1639          Therapeutic Exercise    Comment (Therapeutic Exercise) Right TKR  protocol x 10 reps completed  -MS     Row Name 08/06/18 1639          Pain Assessment    Additional Documentation Pain Scale: Numbers Pre/Post-Treatment (Group)  -MS     Row Name 08/06/18 1639          Pain Scale: Numbers Pre/Post-Treatment    Pain Scale: Numbers, Pretreatment 5/10  -MS     Pain Scale: Numbers, Post-Treatment 5/10  -MS     Pain Location - Side Right  -MS     Pain Location knee  -MS     Pain Intervention(s) Medication (See MAR);Cold applied;Repositioned;Elevated;Rest  -MS     Row Name             Wound 08/06/18 1111 Right knee incision    Wound - Properties Group Date first assessed: 08/06/18  -RG Time first assessed: 1111  -RG Side: Right  -RG Location: knee  -RG Type: incision  -RG    Row Name 08/06/18 1639          Physical Therapy Clinical Impression    Date of Referral to PT 08/06/18  -MS     Criteria for Skilled Interventions Met (PT Clinical Impression) treatment indicated  -MS     Rehab Potential (PT Clinical Summary) good, to achieve stated therapy goals  -MS     Row Name 08/06/18 1639          Physical Therapy Goals    Transfer Goal Selection (PT) transfer, PT goal 1  -MS     Gait Training Goal Selection (PT) gait training, PT goal 1  -MS     ROM Goal Selection (PT) ROM, PT goal 1  -MS     Stairs Goal Selection (PT) stairs, PT goal 1  -MS     Additional Documentation ROM Goal Selection (PT) (Row);Stairs Goal Selection (PT) (Row)  -MS     Row Name 08/06/18 1639          Transfer Goal 1 (PT)    Activity/Assistive Device (Transfer Goal 1, PT) transfers, all;walker, rolling  -MS     [place] Level/Cues Needed (Transfer Goal 1, PT) independent  -MS     Time Frame (Transfer Goal 1, PT) long term goal (LTG);2 - 3 days  -MS     Row Name 08/06/18 1639          Gait Training Goal 1 (PT)    Activity/Assistive Device (Gait Training Goal 1, PT) gait (walking locomotion);walker, rolling  -MS     [place] Level (Gait Training Goal 1, PT) independent  -MS     Distance (Gait Goal 1, PT) 100 feet   -MS     Time Frame (Gait Training Goal 1, PT) long term goal (LTG);2 - 3 days  -MS     Row Name 08/06/18 1639          ROM Goal 1 (PT)    ROM Goal 1 (PT) Right knee AROM (5, 85)  -MS     Time Frame (ROM Goal 1, PT) long term goal (LTG);2 - 3 days  -MS     Row Name 08/06/18 1639          Stairs Goal 1 (PT)    Activity/Assistive Device (Stairs Goal 1, PT) stairs, all skills  -MS     [place] Level/Cues Needed (Stairs Goal 1, PT) contact guard assist  -MS     Number of Stairs (Stairs Goal 1, PT) 1  -MS     Time Frame (Stairs Goal 1, PT) long term goal (LTG);3 days  -MS     Row Name 08/06/18 1639          Positioning and Restraints    Pre-Treatment Position in bed  -MS     Post Treatment Position chair  -MS     In Chair notified nsg;reclined;sitting;call light within reach;encouraged to call for assist;exit alarm on;with family/caregiver   All lines intact. Ice pack to Right knee.  -MS       User Key  (r) = Recorded By, (t) = Taken By, (c) = Cosigned By    Initials Name Provider Type    Becky Solorio, RN Registered Nurse    MS Floyd Husain, PT Physical Therapist          Physical Therapy Education     Title: PT OT SLP Therapies (Done)     Topic: Physical Therapy (Done)     Point: Mobility training (Done)    Learning Progress Summary     Learner Status Readiness Method Response Comment Documented by    Patient Done Acceptance PREMA NAVAS NR  MS 08/06/18 1702          Point: Home exercise program (Done)    Learning Progress Summary     Learner Status Readiness Method Response Comment Documented by    Patient Done Acceptance PREMA NAVAS NR  MS 08/06/18 1702          Point: Body mechanics (Done)    Learning Progress Summary     Learner Status Readiness Method Response Comment Documented by    Patient Done Acceptance PREMA NAVAS NR  MS 08/06/18 1702          Point: Precautions (Done)    Learning Progress Summary     Learner Status Readiness Method Response Comment Documented by    Patient Done Acceptance PREMA NAVAS NR  MS  08/06/18 1702                      User Key     Initials Effective Dates Name Provider Type Discipline    MS 04/03/18 -  Floyd Husain, PT Physical Therapist PT                PT Recommendation and Plan  Anticipated Discharge Disposition (PT): home with assist, home with home health  Planned Therapy Interventions (PT Eval): balance training, bed mobility training, gait training, home exercise program, patient/family education, postural re-education, ROM (range of motion), stair training, strengthening, transfer training  Therapy Frequency (PT Clinical Impression): 2 times/day  Outcome Summary/Treatment Plan (PT)  Anticipated Discharge Disposition (PT): home with assist, home with home health  Plan of Care Reviewed With: patient  Outcome Summary: Pt. will benefit from skilled inpt. P.T. to address her functional deficits and to assist pt. in regaining her maximum level of independence with functional mobility.          Outcome Measures     Row Name 08/06/18 1700             How much help from another person do you currently need...    Turning from your back to your side while in flat bed without using bedrails? 4  -MS      Moving from lying on back to sitting on the side of a flat bed without bedrails? 3  -MS      Moving to and from a bed to a chair (including a wheelchair)? 3  -MS      Standing up from a chair using your arms (e.g., wheelchair, bedside chair)? 3  -MS      Climbing 3-5 steps with a railing? 3  -MS      To walk in hospital room? 3  -MS      AM-PAC 6 Clicks Score 19  -MS         Functional Assessment    Outcome Measure Options AM-PAC 6 Clicks Basic Mobility (PT)  -MS        User Key  (r) = Recorded By, (t) = Taken By, (c) = Cosigned By    Initials Name Provider Type    MS HusainFloyd, PT Physical Therapist           Time Calculation:         PT Charges     Row Name 08/06/18 1704             Time Calculation    Start Time 1515  -MS      Stop Time 1535  -MS      Time Calculation (min) 20 min   -MS      PT Received On 08/06/18  -MS      PT - Next Appointment 08/07/18  -MS      PT Goal Re-Cert Due Date 08/08/18  -MS         Time Calculation- PT    Total Timed Code Minutes- PT 15 minute(s)  -MS        User Key  (r) = Recorded By, (t) = Taken By, (c) = Cosigned By    Initials Name Provider Type    Floyd Toney, PT Physical Therapist        Therapy Suggested Charges     Code   Minutes Charges    None           Therapy Charges for Today     Code Description Service Date Service Provider Modifiers Qty    11836780461 HC PT EVAL LOW COMPLEXITY 1 8/6/2018 Floyd Husain, PT GP 1    85315309254 HC PT THER PROC EA 15 MIN 8/6/2018 Floyd Husain, PT GP 1          PT G-Codes  Outcome Measure Options: AM-PAC 6 Clicks Basic Mobility (PT)      Floyd Husain, PT  8/6/2018

## 2018-08-06 NOTE — ANESTHESIA POSTPROCEDURE EVALUATION
Patient: Roxana Ware    Procedure Summary     Date:  08/06/18 Room / Location:  [facility] OR [address] [place] [address] MAIN OR    Anesthesia Start:  1032 Anesthesia Stop:  1228    Procedure:  RT TOTAL KNEE ARTHROPLASTY (Right Knee) Diagnosis:      Surgeon:  Benjamin Vargas MD Provider:  Gama Najera MD    Anesthesia Type:  general ASA Status:  2          Anesthesia Type: general  Last vitals  BP   136/79 (08/06/18 1230)   Temp   36.3 °C (97.4 °F) (08/06/18 1221)   Pulse   74 (08/06/18 1230)   Resp   14 (08/06/18 1230)     SpO2   100 % (08/06/18 1230)     Post Anesthesia Care and Evaluation    Patient location during evaluation: PACU  Patient participation: complete - patient participated  Level of consciousness: awake and alert  Pain management: adequate  Airway patency: patent  Anesthetic complications: No anesthetic complications    Cardiovascular status: acceptable  Respiratory status: acceptable  Hydration status: acceptable    Comments: --------------------            08/06/18               1230     --------------------   BP:       136/79     Pulse:      74       Resp:       14       Temp:                SpO2:      100%     --------------------

## 2018-08-06 NOTE — PLAN OF CARE
Problem: Patient Care Overview  Goal: Plan of Care Review   08/06/18 1707   Coping/Psychosocial   Plan of Care Reviewed With patient   OTHER   Outcome Summary Pt. will benefit from skilled inpt. P.T. to address her functional deficits and to assist pt. in regaining her maximum level of independence with functional mobility.

## 2018-08-06 NOTE — ANESTHESIA PREPROCEDURE EVALUATION
Anesthesia Evaluation     Patient summary reviewed and Nursing notes reviewed   history of anesthetic complications:               Airway   Mallampati: II  Dental    (+) lower dentures and upper dentures    Pulmonary - negative pulmonary ROS   Cardiovascular     ECG reviewed  Rhythm: regular  Rate: abnormal    (+) hypertension,       Neuro/Psych  (+) psychiatric history Depression and Anxiety,     GI/Hepatic/Renal/Endo    (+)  GERD,      Musculoskeletal     Abdominal    Substance History - negative use     OB/GYN negative ob/gyn ROS         Other   (+) arthritis                     Anesthesia Plan    ASA 2     general     intravenous induction   Anesthetic plan and risks discussed with patient.

## 2018-08-06 NOTE — PLAN OF CARE
Problem: Patient Care Overview  Goal: Plan of Care Review  Outcome: Ongoing (interventions implemented as appropriate)   08/06/18 1610   Coping/Psychosocial   Plan of Care Reviewed With patient   Plan of Care Review   Progress improving   OTHER   Outcome Summary Arrived from PACU at 1405 RTK. vSS voided UP with assist of 2 and walker . Tolerated regular diet. Instructed on need of freq BP checks and antihypertensive meds per hx of HTN        Problem: Fall Risk (Adult)  Goal: Identify Related Risk Factors and Signs and Symptoms  Outcome: Outcome(s) achieved Date Met: 08/06/18    Goal: Absence of Fall  Outcome: Ongoing (interventions implemented as appropriate)      Problem: Knee Arthroplasty (Total, Partial) (Adult)  Goal: Signs and Symptoms of Listed Potential Problems Will be Absent, Minimized or Managed (Knee Arthroplasty)  Outcome: Ongoing (interventions implemented as appropriate)

## 2018-08-06 NOTE — PROGRESS NOTES
Discharge Planning Assessment  [facility]     Patient Name: Roxana Ware  MRN: [id number]  Today's Date: 8/6/2018    Admit Date: 8/6/2018          Discharge Needs Assessment     Row Name 08/06/18 1714       Living Environment    Lives With alone    Current Living Arrangements home/apartment/condo    Primary Care Provided by self    Family Caregiver if Needed child(john), adult    Quality of Family Relationships helpful;involved;supportive    Able to Return to Prior Arrangements yes       Resource/Environmental Concerns    Resource/Environmental Concerns none       Transition Planning    Patient/Family Anticipates Transition to home with family    Patient/Family Anticipated Services at Transition home health care    Transportation Anticipated family or friend will provide       Discharge Needs Assessment    Readmission Within the Last 30 Days no previous admission in last 30 days    Concerns to be Addressed no discharge needs identified    Equipment Currently Used at Home none    Discharge Facility/Level of Care Needs home with home health    Offered/Gave Vendor List yes            Discharge Plan     Row Name 08/06/18 1715       Plan    Plan Home w/ son and [facility]     Patient/Family in Agreement with Plan yes    Plan Comments Facesheet verified per pre-op assessment; pt plans to d/c home w/ HH. Her son has made arrangements to stay w/ her x 1 week. LM for [facility] 805.         Destination     No service coordination in this encounter.      Durable Medical Equipment     No service coordination in this encounter.      Dialysis/Infusion     No service coordination in this encounter.      Home Medical Care - Selection Complete     Service Request Status Selected Specialties Address Phone Number Fax Number    [facility] Selected Home Health Services [address] [phone]        Jud Larsen RN 8/6/2018 1714    SHIRA 8058 .8/6/2018                    Social Care     No service coordination in this encounter.                Demographic Summary    No documentation.           Functional Status     Row Name 08/06/18 3837       Functional Status    Usual Activity Tolerance good    Current Activity Tolerance fair       Functional Status, IADL    Medications independent    Meal Preparation independent    Housekeeping independent    Laundry independent    Shopping independent            Psychosocial    No documentation.           Abuse/Neglect    No documentation.           Legal    No documentation.           Substance Abuse    No documentation.           Patient Forms     Row Name 08/06/18 2288       Patient Forms    Provider Choice List Delivered    Delivered to Patient    Method of delivery Telephone          Jud Larsen RN

## 2018-08-06 NOTE — OP NOTE
Benjamin Vargas M.D. - [facility]    Patient Name:  Roxana Ware  YOB: 1940  Medical Records Number:  [id number]    Date of Procedure:  8/6/2018    Pre-operative Diagnosis:  DJD Right Knee    Post-operative Diagnosis:  DJD Right Knee    Procedure Performed:  Right Total Knee Replacement     Anesthesia: General, supplemented by a periarticular Exparel/bupivacaine injection.      Surgeon: Benjamin Vargas MD     Assistant: Segundo Mathias MD; note that as part of the surgical procedure, I utilized service of an assistant surgeon, specifically Segundo Mathias MD. Assistant surgeon participated in crucial portion of the operation. Use of the assistant surgeon greatly reduced overall operative time, thus significantly reducing overall morbidity for the patient.      Implants:      Implant Name Type Inv. Item Serial No.  Lot No. LRB No. Used Action   CMT BONE PALACOS R HI/VISC 1X40 - [id number] Implant CMT BONE PALACOS R HI/VISC 1X40  [facility] [id number] Right 2 Implanted   PATELLA RESRF JOURNEY 7.5X35MM RND - [id number] Implant PATELLA RESRF JOURNEY 7.5X35MM RND  [employer] [id number] Right 1 Implanted   BASEPLT TIB JOURNEY NONPOR SZ4 RT - [id number] Implant BASEPLT TIB JOURNEY NONPOR SZ4 RT  [employer] [id number] Right 1 Implanted   COMP FEM JOURNEY2 OXINIUM CR RT SZ5 - [id number] Implant COMP FEM JOURNEY2 OXINIUM CR RT SZ5  [employer] [id number] Right 1 Implanted   ARTICULAR INSERT Implant     [employer] [id number] Right 1 Implanted       Implants utilized: I used the SN Journey 2 system.  I used a size 4 tibial baseplate.  Size 5  femur.  11 disihed mm  polyethylene insert.  35 patella.    Tourniquet Time: Was 44 minutes.      Medications: Note that we gave 1 g IV tranexamic acid when the cement was mixed.      Estimated Blood Loss:   minimal    Specimens: * No orders in the log *     Complications: None.      Quality Measures: I have documented  the patient's current medications including dosage, frequency, and route of administration in medical record today. Conservative alternatives were discussed with the patient as part of the decision-making process prior to the procedure. The patient was evaluated immediately preoperatively for cardiovascular and thromboembolic risk factors. There were no acute risk factors.  Prophylactic IV antibiotics were administered before the tourniquet went up.      Description of Procedure: After prep and drape, Right  knee was approached via midline longitudinal anterior incision. Medial parapatellar arthrotomy was extended to the vastus medialis obliquus which was split in line with the fibers near the medial border, in keeping with minimally invasive technique. Patella was osteotomized proper depth for the patella implant. [place] holes are created. Patella was subluxed and protected. Intramedullary instrumentation was used on each side of the joint; careful and thorough canal content irrigation. I cut the femur 10 mm depth, 5° valgus controlling rotation. The femur  was sized appropriatelyt. Anterior, posterior, and chamfer cuts were made. Tibia is cut 10 mm depth, 5° of posterior slope. Meniscectomies are completed. Trial reduction is performed. Rotation is marked and keel slot was created.      Bony surface was thoroughly cleaned and dried. I injected the posterior capsule and medial lateral gutter with Exparel solution containing 20 mL Exparel, 25 mL 0.25% bupivacaine with epinephrine, 20 mL saline. Care was taken to protect popliteal neurovascular structures and the peroneal nerve. I cemented the tibial baseplate,  Femur, and patella.  Trial reduction revealed a 11 mm dished polyethylene insert best balanced stability and range of motion, and is locked in the tibial tray.      Tourniquet was released; hemostasis obtained. Wound was closed in layers with #1 Vicryl on the capsule, 2-0 Vicryl in subcutaneous tissue, and  zipline in the skin over a 1/8-inch drain. Note that I injected my capsule with my Exparel solution during closure.      Benjamin Vargas MD  8/6/2018  3:42 PM

## 2018-08-07 LAB
ANION GAP SERPL CALCULATED.3IONS-SCNC: 12.2 MMOL/L
BUN BLD-MCNC: 18 MG/DL (ref 8–23)
BUN/CREAT SERPL: 20.7 (ref 7–25)
CALCIUM SPEC-SCNC: 8.5 MG/DL (ref 8.6–10.5)
CHLORIDE SERPL-SCNC: 97 MMOL/L (ref 98–107)
CO2 SERPL-SCNC: 23.8 MMOL/L (ref 22–29)
CREAT BLD-MCNC: 0.87 MG/DL (ref 0.57–1)
GFR SERPL CREATININE-BSD FRML MDRD: 63 ML/MIN/1.73
GLUCOSE BLD-MCNC: 125 MG/DL (ref 65–99)
HCT VFR BLD AUTO: 32.8 % (ref 35.6–45.5)
HGB BLD-MCNC: 10.5 G/DL (ref 11.9–15.5)
POTASSIUM BLD-SCNC: 4.7 MMOL/L (ref 3.5–5.2)
SODIUM BLD-SCNC: 133 MMOL/L (ref 136–145)

## 2018-08-07 PROCEDURE — 97110 THERAPEUTIC EXERCISES: CPT

## 2018-08-07 PROCEDURE — 63710000001 PROMETHAZINE PER 12.5 MG: Performed by: NURSE PRACTITIONER

## 2018-08-07 PROCEDURE — 97150 GROUP THERAPEUTIC PROCEDURES: CPT

## 2018-08-07 PROCEDURE — 25010000002 FONDAPARINUX PER 0.5 MG: Performed by: ORTHOPAEDIC SURGERY

## 2018-08-07 PROCEDURE — 25010000003 CEFAZOLIN IN DEXTROSE 2-4 GM/100ML-% SOLUTION: Performed by: ORTHOPAEDIC SURGERY

## 2018-08-07 PROCEDURE — 25010000002 KETOROLAC TROMETHAMINE PER 15 MG: Performed by: ORTHOPAEDIC SURGERY

## 2018-08-07 PROCEDURE — 25010000002 ONDANSETRON PER 1 MG: Performed by: ORTHOPAEDIC SURGERY

## 2018-08-07 PROCEDURE — 85014 HEMATOCRIT: CPT | Performed by: ORTHOPAEDIC SURGERY

## 2018-08-07 PROCEDURE — 85018 HEMOGLOBIN: CPT | Performed by: ORTHOPAEDIC SURGERY

## 2018-08-07 PROCEDURE — 80048 BASIC METABOLIC PNL TOTAL CA: CPT | Performed by: ORTHOPAEDIC SURGERY

## 2018-08-07 RX ORDER — PROMETHAZINE HYDROCHLORIDE 25 MG/1
12.5 SUPPOSITORY RECTAL EVERY 6 HOURS PRN
Status: DISCONTINUED | OUTPATIENT
Start: 2018-08-07 | End: 2018-08-08 | Stop reason: HOSPADM

## 2018-08-07 RX ORDER — SCOLOPAMINE TRANSDERMAL SYSTEM 1 MG/1
1 PATCH, EXTENDED RELEASE TRANSDERMAL
Status: DISCONTINUED | OUTPATIENT
Start: 2018-08-07 | End: 2018-08-08 | Stop reason: HOSPADM

## 2018-08-07 RX ORDER — FONDAPARINUX SODIUM 2.5 MG/.5ML
2.5 INJECTION SUBCUTANEOUS ONCE
Status: COMPLETED | OUTPATIENT
Start: 2018-08-07 | End: 2018-08-07

## 2018-08-07 RX ORDER — PROMETHAZINE HYDROCHLORIDE 12.5 MG/1
12.5 TABLET ORAL EVERY 6 HOURS PRN
Status: DISCONTINUED | OUTPATIENT
Start: 2018-08-07 | End: 2018-08-08 | Stop reason: HOSPADM

## 2018-08-07 RX ORDER — PROMETHAZINE HYDROCHLORIDE 25 MG/ML
12.5 INJECTION, SOLUTION INTRAMUSCULAR; INTRAVENOUS EVERY 6 HOURS PRN
Status: DISCONTINUED | OUTPATIENT
Start: 2018-08-07 | End: 2018-08-08 | Stop reason: HOSPADM

## 2018-08-07 RX ADMIN — ONDANSETRON HYDROCHLORIDE 4 MG: 2 INJECTION, SOLUTION INTRAMUSCULAR; INTRAVENOUS at 00:07

## 2018-08-07 RX ADMIN — KETOROLAC TROMETHAMINE 15 MG: 15 INJECTION, SOLUTION INTRAMUSCULAR; INTRAVENOUS at 07:59

## 2018-08-07 RX ADMIN — OXYCODONE HYDROCHLORIDE AND ACETAMINOPHEN 1 TABLET: 5; 325 TABLET ORAL at 07:58

## 2018-08-07 RX ADMIN — DOCUSATE SODIUM -SENNOSIDES 2 TABLET: 50; 8.6 TABLET, COATED ORAL at 21:49

## 2018-08-07 RX ADMIN — OXYCODONE HYDROCHLORIDE AND ACETAMINOPHEN 2 TABLET: 5; 325 TABLET ORAL at 12:24

## 2018-08-07 RX ADMIN — SODIUM CHLORIDE, POTASSIUM CHLORIDE, SODIUM LACTATE AND CALCIUM CHLORIDE 100 ML/HR: 600; 310; 30; 20 INJECTION, SOLUTION INTRAVENOUS at 02:07

## 2018-08-07 RX ADMIN — SERTRALINE 50 MG: 50 TABLET, FILM COATED ORAL at 07:59

## 2018-08-07 RX ADMIN — FONDAPARINUX SODIUM 2.5 MG: 2.5 INJECTION, SOLUTION SUBCUTANEOUS at 07:59

## 2018-08-07 RX ADMIN — ASPIRIN 325 MG: 325 TABLET, DELAYED RELEASE ORAL at 07:59

## 2018-08-07 RX ADMIN — PROMETHAZINE HYDROCHLORIDE 12.5 MG: 12.5 TABLET ORAL at 04:22

## 2018-08-07 RX ADMIN — CEFAZOLIN SODIUM 2 G: 2 INJECTION, SOLUTION INTRAVENOUS at 04:24

## 2018-08-07 RX ADMIN — LISINOPRIL 10 MG: 10 TABLET ORAL at 07:58

## 2018-08-07 RX ADMIN — ONDANSETRON HYDROCHLORIDE 4 MG: 2 INJECTION, SOLUTION INTRAMUSCULAR; INTRAVENOUS at 06:59

## 2018-08-07 RX ADMIN — FERROUS SULFATE TAB 325 MG (65 MG ELEMENTAL FE) 325 MG: 325 (65 FE) TAB at 07:59

## 2018-08-07 NOTE — THERAPY TREATMENT NOTE
Acute Care - Physical Therapy Treatment Note  [facility]     Patient Name: Roxana Ware  : 1940  MRN: [id number]  Today's Date: 2018  Onset of Illness/Injury or Date of Surgery: 18  Date of Referral to PT: 18  Referring Physician: Benjamin Gonzalez    Admit Date: 2018    Visit Dx:    ICD-10-CM ICD-9-CM   1. Difficulty walking R26.2 719.7     Patient Active Problem List   Diagnosis   • Right knee DJD       Therapy Treatment          Rehabilitation Treatment Summary     Row Name 18 1046             Treatment Time/Intention    Discipline physical therapist  -MS      Document Type therapy note (daily note)  -MS      Subjective Information complains of;fatigue;pain  -MS      Mode of Treatment physical therapy  -MS      Patient Effort good  -MS      Comment Pt. reports pain/soreness in her Right knee this AM.  -MS      Recorded by [MS] Floyd Husain, PT 18 1049      Row Name 18 1046             Cognitive Assessment/Intervention- PT/OT    Orientation Status (Cognition) oriented x 3  -MS      Follows Commands (Cognition) WNL  -MS      Personal Safety Interventions fall prevention program maintained;gait belt;nonskid shoes/slippers when out of bed;supervised activity  -MS      Recorded by [MS] Floyd Husain, PT 18 1049      Row Name 18 1046             Mobility Assessment/Intervention    Right Lower Extremity (Weight-bearing Status) weight-bearing as tolerated (WBAT)  -MS      Recorded by [MS] Floyd Husain, PT 18 1049      Row Name 18 1046             Bed Mobility Assessment/Treatment    Comment (Bed Mobility) Up in chair this AM.  -MS      Recorded by [MS] Floyd Husain, PT 18 1049      Row Name 18 1046             Sit-Stand Transfer    Sit-Stand [place] (Transfers) contact guard  -MS      Assistive Device (Sit-Stand Transfers) walker, front-wheeled  -MS      Recorded by [MS] Floyd Husain, PT 18 1049       Row Name 08/07/18 1046             Stand-Sit Transfer    Stand-Sit [place] (Transfers) contact guard  -MS      Assistive Device (Stand-Sit Transfers) walker, front-wheeled  -MS      Recorded by [MS] Floyd Husain, PT 08/07/18 1049      Row Name 08/07/18 1046             Gait/Stairs Assessment/Training    [place] Level (Gait) contact guard  -MS      Assistive Device (Gait) walker, front-wheeled  -MS      Distance in Feet (Gait) 35 feet  -MS      Pattern (Gait) step-to  -MS      Deviations/Abnormal Patterns (Gait) antalgic;skylar decreased  -MS      Recorded by [MS] Floyd Husain, PT 08/07/18 1049      Row Name 08/07/18 1046             General ROM    RT Lower Ext Rt Knee Extension/Flexion  -MS      Recorded by [MS] Floyd Husain, PT 08/07/18 1049      Row Name 08/07/18 1046             Right Lower Ext    Rt Knee Extension/Flexion AROM (6, 93)  -MS      Recorded by [MS] Floyd Husain, PT 08/07/18 1049      Row Name 08/07/18 1046             Therapeutic Exercise    Comment (Therapeutic Exercise) Right TKR protocol x 15 reps completed  -MS      Recorded by [MS] Floyd Husain, PT 08/07/18 1049      Row Name 08/07/18 1046             Positioning and Restraints    Pre-Treatment Position sitting in chair/recliner  -MS      Post Treatment Position chair  -MS      In Chair notified nsg;reclined;sitting;call light within reach;encouraged to call for assist;with family/caregiver   Ice pack to RIght knee.  -MS      Recorded by [MS] Floyd Husain, PT 08/07/18 1049      Row Name 08/07/18 1046             Pain Scale: Numbers Pre/Post-Treatment    Pain Scale: Numbers, Pretreatment 10/10  -MS      Pain Scale: Numbers, Post-Treatment 7/10  -MS      Pain Location - Side Right  -MS      Pain Location knee  -MS      Pain Intervention(s) Medication (See MAR);Cold applied;Repositioned;Elevated;Rest  -MS      Recorded by [MS] Floyd Husain, PT 08/07/18 1049      Row Name                Wound 08/06/18  1111 Right knee incision    Wound - Properties Group Date first assessed: 08/06/18 [RG] Time first assessed: 1111 [RG] Side: Right [RG] Location: knee [RG] Type: incision [RG] Recorded by:  [RG] Becky Hood RN 08/06/18 1111      User Key  (r) = Recorded By, (t) = Taken By, (c) = Cosigned By    Initials Name Effective Dates Discipline    RG Becky Hood RN 06/16/16 -  Nurse    MS Floyd Husain, PT 04/03/18 -  PT          Wound 08/06/18 1111 Right knee incision (Active)   Dressing Appearance dry;intact;no drainage 8/7/2018  8:00 AM   Closure MAIDA 8/7/2018  8:00 AM   Base dressing in place, unable to visualize 8/7/2018  8:00 AM   Drainage Amount none 8/7/2018  8:00 AM   Dressing Care, Wound elastic bandage 8/6/2018  8:00 PM             Physical Therapy Education     Title: PT OT SLP Therapies (Done)     Topic: Physical Therapy (Done)     Point: Mobility training (Done)    Learning Progress Summary     Learner Status Readiness Method Response Comment Documented by    Patient Done Acceptance E,D VU,NR  MS 08/07/18 1049     Done Acceptance E,D VU,NR  MS 08/06/18 1702          Point: Home exercise program (Done)    Learning Progress Summary     Learner Status Readiness Method Response Comment Documented by    Patient Done Acceptance E,D VU,NR  MS 08/07/18 1049     Done Acceptance E,D VU,NR  MS 08/06/18 1702          Point: Body mechanics (Done)    Learning Progress Summary     Learner Status Readiness Method Response Comment Documented by    Patient Done Acceptance E,D VU,NR  MS 08/07/18 1049     Done Acceptance E,D VU,NR  MS 08/06/18 1702          Point: Precautions (Done)    Learning Progress Summary     Learner Status Readiness Method Response Comment Documented by    Patient Done Acceptance E,D VU,NR  MS 08/07/18 1049     Done Acceptance E,D VU,NR  MS 08/06/18 1702                      User Key     Initials Effective Dates Name Provider Type Discipline    MS 04/03/18 -  Floyd Husain, PT Physical  Therapist PT                    PT Recommendation and Plan  Anticipated Discharge Disposition (PT): home with assist, home with home health  Planned Therapy Interventions (PT Eval): balance training, bed mobility training, gait training, home exercise program, patient/family education, postural re-education, ROM (range of motion), stair training, strengthening, transfer training  Therapy Frequency (PT Clinical Impression): 2 times/day  Outcome Summary/Treatment Plan (PT)  Anticipated Discharge Disposition (PT): home with assist, home with home health  Plan of Care Reviewed With: patient, family  Progress: improving  Outcome Summary: Improved tolerance to functional activity this AM with an increase in gait distance and progression of ther. ex. protocol.          Outcome Measures     Row Name 08/07/18 1000 08/06/18 1700          How much help from another person do you currently need...    Turning from your back to your side while in flat bed without using bedrails? 4  -MS 4  -MS     Moving from lying on back to sitting on the side of a flat bed without bedrails? 3  -MS 3  -MS     Moving to and from a bed to a chair (including a wheelchair)? 3  -MS 3  -MS     Standing up from a chair using your arms (e.g., wheelchair, bedside chair)? 3  -MS 3  -MS     Climbing 3-5 steps with a railing? 3  -MS 3  -MS     To walk in hospital room? 3  -MS 3  -MS     AM-PAC 6 Clicks Score 19  -MS 19  -MS        Functional Assessment    Outcome Measure Options AM-PAC 6 Clicks Basic Mobility (PT)  -MS AM-PAC 6 Clicks Basic Mobility (PT)  -MS       User Key  (r) = Recorded By, (t) = Taken By, (c) = Cosigned By    Initials Name Provider Type    Floyd Toney, PT Physical Therapist           Time Calculation:         PT Charges     Row Name 08/07/18 1051             Time Calculation    Start Time 0927  -MS      Stop Time 1005  -MS      Time Calculation (min) 38 min  -MS      PT Received On 08/07/18  -MS      PT - Next Appointment  08/07/18  -MS         Time Calculation- PT    Total Timed Code Minutes- PT 15 minute(s)  -MS        User Key  (r) = Recorded By, (t) = Taken By, (c) = Cosigned By    Initials Name Provider Type    Floyd Toney, PT Physical Therapist        Therapy Suggested Charges     Code   Minutes Charges    None           Therapy Charges for Today     Code Description Service Date Service Provider Modifiers Qty    54558454225 HC PT EVAL LOW COMPLEXITY 1 8/6/2018 Floyd Husain, PT GP 1    18006435384 HC PT THER PROC EA 15 MIN 8/6/2018 Floyd Husain, PT GP 1    15611541609 HC PT THER PROC EA 15 MIN 8/7/2018 Floyd Husain, PT GP 1    78830113084 HC PT THER PROC GROUP 8/7/2018 Floyd Husain, PT GP 1          PT G-Codes  Outcome Measure Options: AM-PAC 6 Clicks Basic Mobility (PT)    Floyd Husain, PT  8/7/2018

## 2018-08-07 NOTE — PROGRESS NOTES
"/69 (BP Location: Left arm, Patient Position: Lying)   Pulse 74   Temp 96.6 °F (35.9 °C) (Oral)   Resp 16   Ht 165.1 cm (65\")   Wt 88.6 kg (195 lb 5 oz)   SpO2 100%   BMI 32.50 kg/m²     Lab Results (last 24 hours)     Procedure Component Value Units Date/Time    Basic Metabolic Panel [[id number]]  (Abnormal) Collected:  08/07/18 0438    Specimen:  Blood Updated:  08/07/18 0541     Glucose 125 (H) mg/dL      BUN 18 mg/dL      Creatinine 0.87 mg/dL      Sodium 133 (L) mmol/L      Potassium 4.7 mmol/L      Chloride 97 (L) mmol/L      CO2 23.8 mmol/L      Calcium 8.5 (L) mg/dL      eGFR Non African Amer 63 mL/min/1.73      BUN/Creatinine Ratio 20.7     Anion Gap 12.2 mmol/L     Narrative:       The MDRD GFR formula is only valid for adults with stable renal function between ages 18 and 70.    Hemoglobin & Hematocrit, Blood [[id number]]  (Abnormal) Collected:  08/07/18 0438    Specimen:  Blood Updated:  08/07/18 0504     Hemoglobin 10.5 (L) g/dL      Hematocrit 32.8 (L) %           Imaging Results (last 24 hours)     Procedure Component Value Units Date/Time    XR Knee 1 or 2 View Right [[id number]] Collected:  08/06/18 1258     Updated:  08/06/18 1328    Narrative:       RIGHT KNEE: AP, LATERAL     HISTORY: Right knee arthroplasty.     FINDINGS: There has been right total knee arthroplasty and patellar  resurfacing and the components appear in good position. Recent surgical  changes include overlying soft tissue gas and a surgical drain.     This report was finalized on 8/6/2018 1:25 PM by Dr. Jim Fischer M.D.             Patient Care Team:  Zhang Hooper MD as PCP - General (Family Medicine)    SUBJECTIVE  Comfortable  Big problem is nausea (has not taken any pain meds yet)  PHYSICAL EXAM   NV intact  Active Problems:    Right knee DJD      PLAN / DISPOSITION:  Arixtra one time dose  HH discharge with family tomorrow (lives dagmar  Scopolomine patch  Benjamin Vargas MD  08/07/18  6:06 " AM

## 2018-08-07 NOTE — PLAN OF CARE
Problem: Patient Care Overview  Goal: Plan of Care Review   08/07/18 1049   Coping/Psychosocial   Plan of Care Reviewed With patient;family   Plan of Care Review   Progress improving   OTHER   Outcome Summary Improved tolerance to functional activity this AM with an increase in gait distance and progression of ther. ex. protocol.

## 2018-08-07 NOTE — THERAPY TREATMENT NOTE
"Acute Care - Physical Therapy Treatment Note  [facility]     Patient Name: Roxana Ware  : 1940  MRN: [id number]  Today's Date: 2018  Onset of Illness/Injury or Date of Surgery: 18  Date of Referral to PT: 18  Referring Physician: Benjamin Gonzalez    Admit Date: 2018    Visit Dx:    ICD-10-CM ICD-9-CM   1. Difficulty walking R26.2 719.7     Patient Active Problem List   Diagnosis   • Right knee DJD       Therapy Treatment          Rehabilitation Treatment Summary     Row Name 18 1331 18 1046          Treatment Time/Intention    Discipline physical therapist  -MS physical therapist  -MS     Document Type therapy note (daily note)  -MS therapy note (daily note)  -MS     Subjective Information complains of;fatigue;pain  -MS complains of;fatigue;pain  -MS     Mode of Treatment physical therapy  -MS physical therapy  -MS     Patient Effort good  -MS good  -MS     Comment Pt. reports \"sharp\" pains in her Right knee this PM that are intermittent.  -MS Pt. reports pain/soreness in her Right knee this AM.  -MS     Recorded by [MS] Floyd Husain, PT 18 1335 [MS] Floyd Husain, PT 18 1049     Row Name 18 1331 18 1046          Cognitive Assessment/Intervention- PT/OT    Orientation Status (Cognition) oriented x 3  -MS oriented x 3  -MS     Follows Commands (Cognition) WNL  -MS WNL  -MS     Personal Safety Interventions fall prevention program maintained;gait belt;nonskid shoes/slippers when out of bed;supervised activity  -MS fall prevention program maintained;gait belt;nonskid shoes/slippers when out of bed;supervised activity  -MS     Recorded by [MS] Floyd Husain, PT 18 1335 [MS] Floyd Hsuain, PT 18 1049     Row Name 18 1331 18 1046          Mobility Assessment/Intervention    Right Lower Extremity (Weight-bearing Status) weight-bearing as tolerated (WBAT)  -MS weight-bearing as tolerated (WBAT)  -MS     Recorded " by [MS] Floyd Husain, PT 08/07/18 1335 [MS] Floyd Husain, PT 08/07/18 1049     Row Name 08/07/18 1331 08/07/18 1046          Bed Mobility Assessment/Treatment    Comment (Bed Mobility) Pt. up in chair this PM for the gym.  -MS Up in chair this AM.  -MS     Recorded by [MS] Floyd Husain, PT 08/07/18 1335 [MS] Floyd Husain, PT 08/07/18 1049     Row Name 08/07/18 1331 08/07/18 1046          Sit-Stand Transfer    Sit-Stand Emmons (Transfers) contact guard  -MS contact guard  -MS     Assistive Device (Sit-Stand Transfers) walker, front-wheeled  -MS walker, front-wheeled  -MS     Recorded by [MS] Floyd Husain, PT 08/07/18 1335 [MS] Floyd Husain, PT 08/07/18 1049     Row Name 08/07/18 1331 08/07/18 1046          Stand-Sit Transfer    Stand-Sit Emmons (Transfers) contact guard  -MS contact guard  -MS     Assistive Device (Stand-Sit Transfers) walker, front-wheeled  -MS walker, front-wheeled  -MS     Recorded by [MS] Floyd Husain, PT 08/07/18 1335 [MS] Floyd Husain, PT 08/07/18 1049     Row Name 08/07/18 1331 08/07/18 1046          Gait/Stairs Assessment/Training    Emmons Level (Gait) contact guard  -MS contact guard  -MS     Assistive Device (Gait) walker, front-wheeled  -MS walker, front-wheeled  -MS     Distance in Feet (Gait) 60 feet  -MS 35 feet  -MS     Pattern (Gait) step-through  -MS step-to  -MS     Deviations/Abnormal Patterns (Gait)  -- antalgic;skylar decreased  -MS     Recorded by [MS] Floyd Husain, PT 08/07/18 1335 [MS] Floyd Husain, PT 08/07/18 1049     Row Name 08/07/18 1046             General ROM    RT Lower Ext Rt Knee Extension/Flexion  -MS      Recorded by [MS] Floyd Husain, PT 08/07/18 1049      Row Name 08/07/18 1046             Right Lower Ext    Rt Knee Extension/Flexion AROM (6, 93)  -MS      Recorded by [MS] Floyd Husain, PT 08/07/18 1049      Row Name 08/07/18 1331 08/07/18 1046          Therapeutic Exercise    Comment  (Therapeutic Exercise) Right TKR protocol x 20 reps completed  -MS Right TKR protocol x 15 reps completed  -MS     Recorded by [MS] Floyd Husain, PT 08/07/18 1335 [MS] Floyd Husain, PT 08/07/18 1049     Row Name 08/07/18 1331 08/07/18 1046          Positioning and Restraints    Pre-Treatment Position sitting in chair/recliner  -MS sitting in chair/recliner  -MS     Post Treatment Position chair  -MS chair  -MS     In Chair notified nsg;reclined;sitting;call light within reach;encouraged to call for assist;with family/caregiver   Ice pack to Right knee.  -MS notified nsg;reclined;sitting;call light within reach;encouraged to call for assist;with family/caregiver   Ice pack to RIght knee.  -MS     Recorded by [MS] Floyd Husain, PT 08/07/18 1335 [MS] Floyd Husain, PT 08/07/18 1049     Row Name 08/07/18 1331 08/07/18 1046          Pain Scale: Numbers Pre/Post-Treatment    Pain Scale: Numbers, Pretreatment 8/10  -MS 10/10  -MS     Pain Scale: Numbers, Post-Treatment 5/10  -MS 7/10  -MS     Pain Location - Side Right  -MS Right  -MS     Pain Location knee  -MS knee  -MS     Pain Intervention(s) Medication (See MAR);Cold applied;Repositioned;Elevated;Rest  -MS Medication (See MAR);Cold applied;Repositioned;Elevated;Rest  -MS     Recorded by [MS] Floyd Husain, PT 08/07/18 1335 [MS] Floyd Husain, PT 08/07/18 1049     Row Name                Wound 08/06/18 1111 Right knee incision    Wound - Properties Group Date first assessed: 08/06/18 [RG] Time first assessed: 1111 [RG] Side: Right [RG] Location: knee [RG] Type: incision [RG] Recorded by:  [RG] Becky Hood RN 08/06/18 1111      User Key  (r) = Recorded By, (t) = Taken By, (c) = Cosigned By    Initials Name Effective Dates Discipline    RG Becky Hood RN 06/16/16 -  Nurse    Floyd Toney, PT 04/03/18 -  PT          Wound 08/06/18 1111 Right knee incision (Active)   Dressing Appearance dry;intact;no drainage 8/7/2018  12:24 PM   Closure Adhesive bandage 8/7/2018 12:24 PM   Base clean;dry;scab 8/7/2018 12:24 PM   Drainage Amount none 8/7/2018 12:24 PM   Dressing Care, Wound elastic bandage 8/6/2018  8:00 PM             Physical Therapy Education     Title: PT OT SLP Therapies (Done)     Topic: Physical Therapy (Done)     Point: Mobility training (Done)    Learning Progress Summary     Learner Status Readiness Method Response Comment Documented by    Patient Done Acceptance E,D VU,NR  MS 08/07/18 1049     Done Acceptance E,D VU,NR  MS 08/06/18 1702          Point: Home exercise program (Done)    Learning Progress Summary     Learner Status Readiness Method Response Comment Documented by    Patient Done Acceptance E,D VU,NR  MS 08/07/18 1049     Done Acceptance E,D VU,NR  MS 08/06/18 1702          Point: Body mechanics (Done)    Learning Progress Summary     Learner Status Readiness Method Response Comment Documented by    Patient Done Acceptance E,D VU,NR  MS 08/07/18 1049     Done Acceptance ED VU,NR  MS 08/06/18 1702          Point: Precautions (Done)    Learning Progress Summary     Learner Status Readiness Method Response Comment Documented by    Patient Done Acceptance E,D VU,NR  MS 08/07/18 1049     Done Acceptance E,D VU,NR  MS 08/06/18 1702                      User Key     Initials Effective Dates Name Provider Type Discipline    MS 04/03/18 -  Floyd Husain, PT Physical Therapist PT                    PT Recommendation and Plan  Anticipated Discharge Disposition (PT): home with assist, home with home health  Planned Therapy Interventions (PT Eval): balance training, bed mobility training, gait training, home exercise program, patient/family education, postural re-education, ROM (range of motion), stair training, strengthening, transfer training  Therapy Frequency (PT Clinical Impression): 2 times/day  Outcome Summary/Treatment Plan (PT)  Anticipated Discharge Disposition (PT): home with assist, home with home  health  Plan of Care Reviewed With: patient, family  Progress: improving  Outcome Summary: Improved tolerance to functional activity this PM with an increase in gait distance and progression of ther. ex. protocol.          Outcome Measures     Row Name 08/07/18 1300 08/07/18 1000 08/06/18 1700       How much help from another person do you currently need...    Turning from your back to your side while in flat bed without using bedrails? 4  -MS 4  -MS 4  -MS    Moving from lying on back to sitting on the side of a flat bed without bedrails? 3  -MS 3  -MS 3  -MS    Moving to and from a bed to a chair (including a wheelchair)? 3  -MS 3  -MS 3  -MS    Standing up from a chair using your arms (e.g., wheelchair, bedside chair)? 3  -MS 3  -MS 3  -MS    Climbing 3-5 steps with a railing? 3  -MS 3  -MS 3  -MS    To walk in hospital room? 3  -MS 3  -MS 3  -MS    AM-PAC 6 Clicks Score 19  -MS 19  -MS 19  -MS       Functional Assessment    Outcome Measure Options AM-PAC 6 Clicks Basic Mobility (PT)  -MS AM-PAC 6 Clicks Basic Mobility (PT)  -MS AM-PAC 6 Clicks Basic Mobility (PT)  -MS      User Key  (r) = Recorded By, (t) = Taken By, (c) = Cosigned By    Initials Name Provider Type    Floyd Toney, PT Physical Therapist           Time Calculation:         PT Charges     Row Name 08/07/18 1335 08/07/18 1051          Time Calculation    Start Time 1306  -MS 0927  -MS     Stop Time 1327  -MS 1005  -MS     Time Calculation (min) 21 min  -MS 38 min  -MS     PT Received On 08/07/18  -MS 08/07/18  -MS     PT - Next Appointment 08/08/18  -MS 08/07/18  -MS        Time Calculation- PT    Total Timed Code Minutes- PT 15 minute(s)  -MS 15 minute(s)  -MS       User Key  (r) = Recorded By, (t) = Taken By, (c) = Cosigned By    Initials Name Provider Type    Floyd Toney, PT Physical Therapist        Therapy Suggested Charges     Code   Minutes Charges    None           Therapy Charges for Today     Code Description Service Date  Service Provider Modifiers Qty    62357297651 HC PT EVAL LOW COMPLEXITY 1 8/6/2018 Floyd Husain, PT GP 1    30205164562 HC PT THER PROC EA 15 MIN 8/6/2018 Floyd Husain, PT GP 1    48714815487 HC PT THER PROC EA 15 MIN 8/7/2018 Floyd Husain, PT GP 1    69908598828 HC PT THER PROC GROUP 8/7/2018 Floyd Husain, PT GP 1    82022118493 HC PT THER PROC EA 15 MIN 8/7/2018 Floyd Husain, PT GP 1    15041057858 HC PT THER PROC GROUP 8/7/2018 Floyd Husain, PT GP 1          PT G-Codes  Outcome Measure Options: AM-PAC 6 Clicks Basic Mobility (PT)    Floyd Husain, PT  8/7/2018

## 2018-08-07 NOTE — PLAN OF CARE
Problem: Patient Care Overview  Goal: Plan of Care Review   08/07/18 0216   Coping/Psychosocial   Plan of Care Reviewed With patient;family   Plan of Care Review   Progress improving   OTHER   Outcome Summary Improved tolerance to functional activity this PM with an increase in gait distance and progression of ther. ex. protocol.

## 2018-08-07 NOTE — PLAN OF CARE
Problem: Patient Care Overview  Goal: Plan of Care Review  Outcome: Ongoing (interventions implemented as appropriate)   08/07/18 5653   Coping/Psychosocial   Plan of Care Reviewed With patient   Plan of Care Review   Progress improving   OTHER   Outcome Summary Pain well controlled with PO pain medication. Ambulates with assist x1 and walker. DC home tomorrow. No complaints of nausea this shift. Educated about BP monitoring.      Goal: Individualization and Mutuality  Outcome: Ongoing (interventions implemented as appropriate)    Goal: Discharge Needs Assessment  Outcome: Ongoing (interventions implemented as appropriate)    Goal: Interprofessional Rounds/Family Conf  Outcome: Ongoing (interventions implemented as appropriate)      Problem: Fall Risk (Adult)  Goal: Absence of Fall  Outcome: Ongoing (interventions implemented as appropriate)      Problem: Knee Arthroplasty (Total, Partial) (Adult)  Goal: Signs and Symptoms of Listed Potential Problems Will be Absent, Minimized or Managed (Knee Arthroplasty)  Outcome: Ongoing (interventions implemented as appropriate)    Goal: Anesthesia/Sedation Recovery  Outcome: Outcome(s) achieved Date Met: 08/07/18

## 2018-08-07 NOTE — PLAN OF CARE
Problem: Patient Care Overview  Goal: Plan of Care Review  Outcome: Ongoing (interventions implemented as appropriate)   08/07/18 0321   Coping/Psychosocial   Plan of Care Reviewed With patient   OTHER   Outcome Summary VSS, continues with nausea, BSC, toradol for pain effective,discussed monitoring blood pressure at home.     Goal: Individualization and Mutuality  Outcome: Ongoing (interventions implemented as appropriate)    Goal: Discharge Needs Assessment  Outcome: Ongoing (interventions implemented as appropriate)      Problem: Fall Risk (Adult)  Goal: Absence of Fall  Outcome: Ongoing (interventions implemented as appropriate)      Problem: Knee Arthroplasty (Total, Partial) (Adult)  Goal: Signs and Symptoms of Listed Potential Problems Will be Absent, Minimized or Managed (Knee Arthroplasty)  Outcome: Ongoing (interventions implemented as appropriate)

## 2018-08-08 VITALS
WEIGHT: 195.31 LBS | SYSTOLIC BLOOD PRESSURE: 155 MMHG | DIASTOLIC BLOOD PRESSURE: 78 MMHG | HEART RATE: 82 BPM | HEIGHT: 65 IN | OXYGEN SATURATION: 92 % | TEMPERATURE: 99.3 F | BODY MASS INDEX: 32.54 KG/M2 | RESPIRATION RATE: 16 BRPM

## 2018-08-08 PROCEDURE — 97110 THERAPEUTIC EXERCISES: CPT

## 2018-08-08 PROCEDURE — 97150 GROUP THERAPEUTIC PROCEDURES: CPT

## 2018-08-08 RX ADMIN — OXYCODONE HYDROCHLORIDE AND ACETAMINOPHEN 1 TABLET: 5; 325 TABLET ORAL at 07:02

## 2018-08-08 RX ADMIN — ASPIRIN 325 MG: 325 TABLET, DELAYED RELEASE ORAL at 09:37

## 2018-08-08 RX ADMIN — SERTRALINE 50 MG: 50 TABLET, FILM COATED ORAL at 09:37

## 2018-08-08 RX ADMIN — LISINOPRIL 10 MG: 10 TABLET ORAL at 09:37

## 2018-08-08 RX ADMIN — OXYCODONE HYDROCHLORIDE AND ACETAMINOPHEN 1 TABLET: 5; 325 TABLET ORAL at 01:05

## 2018-08-08 RX ADMIN — OXYCODONE HYDROCHLORIDE AND ACETAMINOPHEN 2 TABLET: 5; 325 TABLET ORAL at 11:23

## 2018-08-08 NOTE — PLAN OF CARE
Problem: Patient Care Overview  Goal: Plan of Care Review   08/08/18 1035   Coping/Psychosocial   Plan of Care Reviewed With patient   Plan of Care Review   Progress improving   OTHER   Outcome Summary Improved tolerance to functional activity this day with an increase in gait distance, progression of ther. ex. protocol, and completion of stair training. Pt. to discharge home with HHPT this day.

## 2018-08-08 NOTE — DISCHARGE SUMMARY
Orthopedic Discharge Summary      Patient: Roxana Ware      YOB: 1940    Medical Record Number: [id number]    Attending Physician: Benjamin Vargas MD  Consulting Physician(s):   Date of Admission: 8/6/2018  7:58 AM  Date of Discharge:       Patient Active Problem List   Diagnosis   • Right knee DJD     [unfilled]    Allergies:   Allergies   Allergen Reactions   • Sulfa Antibiotics Hives       Current Medications:     Discharge Medications      ASK your doctor about these medications      Instructions Start Date   lisinopril 10 MG tablet  Commonly known as:  PRINIVIL,ZESTRIL   10 mg, Oral, Daily      sertraline 50 MG tablet  Commonly known as:  ZOLOFT   50 mg, Oral, Daily      ZEGERID  MG capsule  Generic drug:  Omeprazole-Sodium Bicarbonate   1 capsule, Oral, Every Other Day                 Past Medical History:   Diagnosis Date   • Arthritis    • Depression    • GERD (gastroesophageal reflux disease)    • Hypertension    • PONV (postoperative nausea and vomiting)         Past Surgical History:   Procedure Laterality Date   • COLONOSCOPY     • ROTATOR CUFF REPAIR     • TOTAL KNEE ARTHROPLASTY Right 8/6/2018    Procedure: RT TOTAL KNEE ARTHROPLASTY;  Surgeon: Benjamin Vargas MD;  Location: [facility];  Service: Orthopedics        Social History     Occupational History   • Not on file.     Social History Main Topics   • Smoking status: Never Smoker   • Smokeless tobacco: Never Used   • Alcohol use No   • Drug use: No   • Sexual activity: Not on file      Social History     Social History Narrative   • No narrative on file        Family History   Problem Relation Age of Onset   • Malig Hyperthermia Neg Hx          Physical Exam: 78 y.o. female  General Appearance:    Alert, cooperative, in no acute distress                      Vitals:    08/07/18 1820 08/07/18 2258 08/08/18 0323 08/08/18 0700   BP: 103/65 113/59 141/80 135/83   BP Location: Left arm Left arm Left arm Right arm    Patient Position: Lying Lying Lying Sitting   Pulse: 86 85 86 99   Resp: 16 16 16 16   Temp: 98.1 °F (36.7 °C) 98.2 °F (36.8 °C) 98.1 °F (36.7 °C) 98.6 °F (37 °C)   TempSrc: Oral Oral Oral Oral   SpO2: 93% 93% 96% 92%   Weight:       Height:            Head:    Normocephalic, without obvious abnormality, atraumatic   Eyes:            Lids and lashes normal, conjunctivae and sclerae normal, no   icterus, no pallor, corneas clear, PERRLA   Ears:    Ears appear intact with no abnormalities noted   Throat:   No oral lesions, no thrush, oral mucosa moist   Neck:   No adenopathy, supple, trachea midline, no thyromegaly, no    carotid bruit, no JVD   Back:     No kyphosis present, no scoliosis present, no skin lesions,       erythema or scars, no tenderness to percussion or                   palpation,   range of motion normal   Lungs:     Clear to auscultation,respirations regular, even and                   unlabored    Heart:    Regular rhythm and normal rate, normal S1 and S2, no            murmur, no gallop, no rub, no click   Chest Wall:    No abnormalities observed   Abdomen:     Normal bowel sounds, no masses, no organomegaly, soft        non-tender, non-distended, no guarding, no rebound                 tenderness   Rectal:     Deferred   Extremities:   Incision intact without signs or symptoms of infection.               Neurovascular status remains intact to operative extremity.      Moves all extremities well, no edema, no cyanosis, no              redness   Pulses:   Pulses palpable and equal bilaterally   Skin:   No bleeding, bruising or rash   Lymph nodes:   No palpable adenopathy   Neurologic:   Cranial nerves 2 - 12 grossly intact, sensation intact, DTR        present and equal bilaterally           Hospital Course:  78 y.o. female admitted to [facility] to services of Benjamin Vargas MD with Right knee DJD [M17.11] on 8/6/2018 and underwent [unfilled]  Per [unfilled]. Antibiotic and VTE  prophylaxis were per SCIP protocols. Post-operatively the patient transferred to the post-operative floor where the patient underwent mobilization therapy that included active as well as passive ROM exercises. Opioids were titrated to achieve appropriate pain management to allow for participation in mobilization exercises. Vital signs are now stable. The incision is intact without signs or symptoms of infection. Operative extremity neurovascular status remains intact.   Appropriate education re: incision care, activity levels, medications, and follow up visits was completed and all questions were answered. The patient is now deemed stable for discharge to Home.      DIAGNOSTIC TESTS:     Admission on 08/06/2018   Component Date Value Ref Range Status   • Glucose 08/07/2018 125* 65 - 99 mg/dL Final   • BUN 08/07/2018 18  8 - 23 mg/dL Final   • Creatinine 08/07/2018 0.87  0.57 - 1.00 mg/dL Final   • Sodium 08/07/2018 133* 136 - 145 mmol/L Final   • Potassium 08/07/2018 4.7  3.5 - 5.2 mmol/L Final   • Chloride 08/07/2018 97* 98 - 107 mmol/L Final   • CO2 08/07/2018 23.8  22.0 - 29.0 mmol/L Final   • Calcium 08/07/2018 8.5* 8.6 - 10.5 mg/dL Final   • eGFR Non  Amer 08/07/2018 63  >60 mL/min/1.73 Final   • BUN/Creatinine Ratio 08/07/2018 20.7  7.0 - 25.0 Final   • Anion Gap 08/07/2018 12.2  mmol/L Final   • Hemoglobin 08/07/2018 10.5* 11.9 - 15.5 g/dL Final   • Hematocrit 08/07/2018 32.8* 35.6 - 45.5 % Final       No results found.    Discharge and Follow up Instructions:    mg once daily x's 6 weeks  Percocet 5/325 mg for pain    Date: 8/8/2018    BRANDON Rogers

## 2018-08-08 NOTE — PLAN OF CARE
Problem: Patient Care Overview  Goal: Plan of Care Review  Outcome: Ongoing (interventions implemented as appropriate)   08/08/18 0253   Coping/Psychosocial   Plan of Care Reviewed With patient   OTHER   Outcome Summary po pain medication effective, nausea subsiding, voiding BSC, possible discharge today, discussed importance of monitoring blood pressure at home.     Goal: Individualization and Mutuality  Outcome: Ongoing (interventions implemented as appropriate)    Goal: Discharge Needs Assessment  Outcome: Ongoing (interventions implemented as appropriate)      Problem: Hip Arthroplasty (Total, Partial) (Adult)  Goal: Signs and Symptoms of Listed Potential Problems Will be Absent, Minimized or Managed (Hip Arthroplasty)  Outcome: Ongoing (interventions implemented as appropriate)      Problem: Fall Risk (Adult)  Goal: Absence of Fall  Outcome: Ongoing (interventions implemented as appropriate)      Problem: Knee Arthroplasty (Total, Partial) (Adult)  Goal: Signs and Symptoms of Listed Potential Problems Will be Absent, Minimized or Managed (Knee Arthroplasty)  Outcome: Ongoing (interventions implemented as appropriate)

## 2018-08-08 NOTE — THERAPY DISCHARGE NOTE
Acute Care - Physical Therapy Treatment Note/Discharge  [facility]     Patient Name: Roxana Ware  : 1940  MRN: [id number]  Today's Date: 2018  Onset of Illness/Injury or Date of Surgery: 18  Date of Referral to PT: 18  Referring Physician: Benjamin Gonzalez    Admit Date: 2018    Visit Dx:    ICD-10-CM ICD-9-CM   1. Difficulty walking R26.2 719.7   2. Primary osteoarthritis of right knee M17.11 715.16     Patient Active Problem List   Diagnosis   • Right knee DJD       Physical Therapy Education     Title: PT OT SLP Therapies (Done)     Topic: Physical Therapy (Done)     Point: Mobility training (Done)    Learning Progress Summary     Learner Status Readiness Method Response Comment Documented by    Patient Done Acceptance E,D VU,NR  MS 18 1035     Done Acceptance E,D VU,NR  MS 18 1049     Done Acceptance E,D VU,NR  MS 18 1702          Point: Home exercise program (Done)    Learning Progress Summary     Learner Status Readiness Method Response Comment Documented by    Patient Done Acceptance E,D VU,NR  MS 18 1035     Done Acceptance E,D VU,NR  MS 18 1049     Done Acceptance E,D VU,NR  MS 18 1702          Point: Body mechanics (Done)    Learning Progress Summary     Learner Status Readiness Method Response Comment Documented by    Patient Done Acceptance E,D VU,NR  MS 18 1035     Done Acceptance E,D VU,NR  MS 18 1049     Done Acceptance E,D VU,NR  MS 18 1702          Point: Precautions (Done)    Learning Progress Summary     Learner Status Readiness Method Response Comment Documented by    Patient Done Acceptance E,D VU,NR  MS 18 1035     Done Acceptance E,D VU,NR  MS 18 1049     Done Acceptance E,D VU,NR  MS 18 1702                      User Key     Initials Effective Dates Name Provider Type Discipline    MS 18 -  Floyd Husain, PT Physical Therapist PT                Therapy Treatment         Rehabilitation Treatment Summary     Row Name 08/08/18 1033             Treatment Time/Intention    Discipline physical therapist  -MS      Document Type therapy note (daily note);discharge treatment  -MS      Subjective Information complains of;pain;fatigue  -MS      Mode of Treatment physical therapy  -MS      Patient Effort good  -MS      Comment Pt. reports continued pain/soreness in her Right knee this AM.  -MS      Recorded by [MS] Floyd Husain, PT 08/08/18 1035      Row Name 08/08/18 1033             Cognitive Assessment/Intervention- PT/OT    Orientation Status (Cognition) oriented x 3  -MS      Follows Commands (Cognition) WNL  -MS      Personal Safety Interventions fall prevention program maintained;gait belt;nonskid shoes/slippers when out of bed;supervised activity  -MS      Recorded by [MS] Floyd Husain, PT 08/08/18 1035      Row Name 08/08/18 1033             Mobility Assessment/Intervention    Right Lower Extremity (Weight-bearing Status) weight-bearing as tolerated (WBAT)  -MS      Recorded by [MS] Floyd Husain, PT 08/08/18 1035      Row Name 08/08/18 1033             Bed Mobility Assessment/Treatment    Comment (Bed Mobility) Up in chair this AM.  -MS      Recorded by [MS] Floyd Husain, PT 08/08/18 1035      Row Name 08/08/18 1033             Sit-Stand Transfer    Sit-Stand [place] (Transfers) independent  -MS      Assistive Device (Sit-Stand Transfers) walker, front-wheeled  -MS      Recorded by [MS] Floyd Husain, PT 08/08/18 1035      Row Name 08/08/18 1033             Stand-Sit Transfer    Stand-Sit [place] (Transfers) independent  -MS      Assistive Device (Stand-Sit Transfers) walker, front-wheeled  -MS      Recorded by [MS] Floyd Husain, PT 08/08/18 1035      Row Name 08/08/18 1033             Gait/Stairs Assessment/Training    [place] Level (Gait) independent  -MS      Assistive Device (Gait) walker, front-wheeled  -MS      Distance in Feet  (Gait) 100 feet  -MS      Pattern (Gait) step-through  -MS      Deviations/Abnormal Patterns (Gait) antalgic;skylar decreased  -MS      [place] Level (Stairs) contact guard  -MS      Handrail Location (Stairs) none  -MS      Number of Steps (Stairs) 1   Backwards with use of Walker  -MS      Recorded by [MS] Floyd Husain, PT 08/08/18 1035      Row Name 08/08/18 1033             Right Lower Ext    Rt Knee Extension/Flexion AROM (6, 85)  -MS      Recorded by [MS] Floyd Husain, PT 08/08/18 1035      Row Name 08/08/18 1033             Therapeutic Exercise    Comment (Therapeutic Exercise) Right TKR protocol x 25 reps completed  -MS      Recorded by [MS] Floyd Husain, PT 08/08/18 1035      Row Name 08/08/18 1033             Positioning and Restraints    Pre-Treatment Position sitting in chair/recliner  -MS      Post Treatment Position chair  -MS      In Chair notified nsg;reclined;sitting;call light within reach;encouraged to call for assist;with family/caregiver   Ice pack to Right knee.  -MS      Recorded by [MS] Floyd Husain, PT 08/08/18 1035      Row Name 08/08/18 1033             Pain Scale: Numbers Pre/Post-Treatment    Pain Scale: Numbers, Pretreatment 4/10  -MS      Pain Scale: Numbers, Post-Treatment 7/10  -MS      Pain Location - Side Right  -MS      Pain Location knee  -MS      Pain Intervention(s) Medication (See MAR);Cold applied;Repositioned;Elevated;Rest  -MS      Recorded by [MS] Floyd Husain, PT 08/08/18 1035      Row Name                Wound 08/06/18 1111 Right knee incision    Wound - Properties Group Date first assessed: 08/06/18 [RG] Time first assessed: 1111 [RG] Side: Right [RG] Location: knee [RG] Type: incision [RG] Recorded by:  [RG] Becky Hood RN 08/06/18 1111      User Key  (r) = Recorded By, (t) = Taken By, (c) = Cosigned By    Initials Name Effective Dates Discipline    RG Becky Hood RN 06/16/16 -  Nurse    Floyd Toney, PT 04/03/18 -   PT        Wound 08/06/18 1111 Right knee incision (Active)   Dressing Appearance dry;intact;no drainage 8/8/2018  8:30 AM   Closure MAIDA 8/7/2018  4:33 PM   Base dressing in place, unable to visualize 8/7/2018  4:33 PM   Drainage Amount none 8/8/2018  8:30 AM   Care, Wound other (see comments) 8/8/2018  8:30 AM   Dressing Care, Wound other (see comments) 8/7/2018  8:20 PM       PT Recommendation and Plan  Anticipated Discharge Disposition (PT): home with assist, home with home health  Planned Therapy Interventions (PT Eval): balance training, bed mobility training, gait training, home exercise program, patient/family education, postural re-education, ROM (range of motion), stair training, strengthening, transfer training  Therapy Frequency (PT Clinical Impression): 2 times/day  Outcome Summary/Treatment Plan (PT)  Anticipated Discharge Disposition (PT): home with assist, home with home health  Plan of Care Reviewed With: patient  Progress: improving  Outcome Summary: Improved tolerance to functional activity this day with an increase in gait distance, progression of ther. ex. protocol, and completion of stair training. Pt. to discharge home with HHPT this day.          Outcome Measures     Row Name 08/08/18 1000 08/07/18 1300 08/07/18 1000       How much help from another person do you currently need...    Turning from your back to your side while in flat bed without using bedrails? 4  -MS 4  -MS 4  -MS    Moving from lying on back to sitting on the side of a flat bed without bedrails? 4  -MS 3  -MS 3  -MS    Moving to and from a bed to a chair (including a wheelchair)? 4  -MS 3  -MS 3  -MS    Standing up from a chair using your arms (e.g., wheelchair, bedside chair)? 4  -MS 3  -MS 3  -MS    Climbing 3-5 steps with a railing? 3  -MS 3  -MS 3  -MS    To walk in hospital room? 4  -MS 3  -MS 3  -MS    AM-PAC 6 Clicks Score 23  -MS 19  -MS 19  -MS       Functional Assessment    Outcome Measure Options AM-PAC 6 Clicks  Basic Mobility (PT)  -MS AM-PAC 6 Clicks Basic Mobility (PT)  -MS AM-PAC 6 Clicks Basic Mobility (PT)  -MS    Row Name 08/06/18 1700             How much help from another person do you currently need...    Turning from your back to your side while in flat bed without using bedrails? 4  -MS      Moving from lying on back to sitting on the side of a flat bed without bedrails? 3  -MS      Moving to and from a bed to a chair (including a wheelchair)? 3  -MS      Standing up from a chair using your arms (e.g., wheelchair, bedside chair)? 3  -MS      Climbing 3-5 steps with a railing? 3  -MS      To walk in hospital room? 3  -MS      AM-PAC 6 Clicks Score 19  -MS         Functional Assessment    Outcome Measure Options AM-PAC 6 Clicks Basic Mobility (PT)  -MS        User Key  (r) = Recorded By, (t) = Taken By, (c) = Cosigned By    Initials Name Provider Type    Floyd Toney, PT Physical Therapist           Time Calculation:         PT Charges     Row Name 08/08/18 1036             Time Calculation    Start Time 0840  -MS      Stop Time 0922  -MS      Time Calculation (min) 42 min  -MS      PT Received On 08/08/18  -MS         Time Calculation- PT    Total Timed Code Minutes- PT 15 minute(s)  -MS        User Key  (r) = Recorded By, (t) = Taken By, (c) = Cosigned By    Initials Name Provider Type    Floyd Toney, PT Physical Therapist        Therapy Suggested Charges     Code   Minutes Charges    None             Therapy Charges for Today     Code Description Service Date Service Provider Modifiers Qty    58549210816 HC PT THER PROC EA 15 MIN 8/7/2018 Floyd Husain, PT GP 1    36799959620 HC PT THER PROC GROUP 8/7/2018 Floyd Husain, PT GP 1    90985605857 HC PT THER PROC EA 15 MIN 8/7/2018 Floyd Husain, PT GP 1    68438427914 HC PT THER PROC GROUP 8/7/2018 Floyd Husain, PT GP 1    94847116518 HC PT THER PROC EA 15 MIN 8/8/2018 Floyd Husain, PT GP 1    94480484021 HC PT THER PROC  GROUP 8/8/2018 Floyd Husain, PT GP 1          PT G-Codes  Outcome Measure Options: AM-PAC 6 Clicks Basic Mobility (PT)    PT Discharge Summary  Anticipated Discharge Disposition (PT): home with assist, home with home health  Reason for Discharge: Discharge from facility  Outcomes Achieved: Refer to plan of care for updates on goals achieved  Discharge Destination: Home with assist, Home with home health    Floyd Husain, PT  8/8/2018

## 2018-08-08 NOTE — PROGRESS NOTES
"/83 (BP Location: Right arm, Patient Position: Sitting)   Pulse 99   Temp 98.6 °F (37 °C) (Oral)   Resp 16   Ht 165.1 cm (65\")   Wt 88.6 kg (195 lb 5 oz)   SpO2 92%   BMI 32.50 kg/m²       Results from last 7 days  Lab Units 08/07/18  0438   HEMOGLOBIN g/dL 10.5*   HEMATOCRIT % 32.8*         Results from last 7 days  Lab Units 08/07/18  0438   SODIUM mmol/L 133*   POTASSIUM mmol/L 4.7   CHLORIDE mmol/L 97*   CO2 mmol/L 23.8   BUN mg/dL 18   CREATININE mg/dL 0.87   GLUCOSE mg/dL 125*   CALCIUM mg/dL 8.5*       Imaging Results (last 24 hours)     ** No results found for the last 24 hours. **          Patient Care Team:  Zhang Hooper MD as PCP - General (Family Medicine)    SUBJECTIVE  Doing well   PHYSICAL EXAM  Wound looks great   Active Problems:    Right knee DJD      PLAN / DISPOSITION:  D/C with BRANDON Boyer  08/08/18  8:45 AM    "

## (undated) DEVICE — GAUZE,SPONGE,FLUFF,6"X6.75",STRL,5/TRAY: Brand: MEDLINE

## (undated) DEVICE — STPLR SKIN VISISTAT WD 35CT

## (undated) DEVICE — SYR LUERLOK 20CC

## (undated) DEVICE — SOL NACL 0.9PCT 1000ML

## (undated) DEVICE — GLV SURG TRIUMPH CLASSIC PF LTX 8.5 STRL

## (undated) DEVICE — BANDAGE,GAUZE,BULKEE II,4.5"X4.1YD,STRL: Brand: MEDLINE

## (undated) DEVICE — DRSNG WND GZ CURAD OIL EMULSION 3X8IN LF STRL 1PK

## (undated) DEVICE — GAUZE,SPONGE,FLUFF,6"X6.75",STRL,10/TRAY: Brand: MEDLINE

## (undated) DEVICE — UNDERCAST PADDING: Brand: DEROYAL

## (undated) DEVICE — PK KN TOTL 40

## (undated) DEVICE — APPL DURAPREP IODOPHOR APL 26ML

## (undated) DEVICE — ENCORE® LATEX ORTHO SIZE 9, STERILE LATEX POWDER-FREE SURGICAL GLOVE: Brand: ENCORE

## (undated) DEVICE — ANTIBACTERIAL UNDYED BRAIDED (POLYGLACTIN 910), SYNTHETIC ABSORBABLE SUTURE: Brand: COATED VICRYL

## (undated) DEVICE — 2108 SERIES SAGITTAL BLADE, NO OFFSET  (12.4 X 1.19 X 82.1MM)

## (undated) DEVICE — ENCORE® LATEX ORTHO SIZE 8.5, STERILE LATEX POWDER-FREE SURGICAL GLOVE: Brand: ENCORE

## (undated) DEVICE — DRSNG ADAPTIC 3X8

## (undated) DEVICE — NDL SPINE 20G 3 1/2 YEL STRL 1P/U

## (undated) DEVICE — DUAL CUT SAGITTAL BLADE

## (undated) DEVICE — BNDG ELAS ELITE V/CLOSE 6IN 5YD LF STRL

## (undated) DEVICE — GLV SURG SENSICARE W/ALOE PF LF 9 STRL

## (undated) DEVICE — KT DRN EVAC WND PVC PCH WTROC RND 10F400